# Patient Record
Sex: MALE | Race: ASIAN | NOT HISPANIC OR LATINO | Employment: FULL TIME | URBAN - METROPOLITAN AREA
[De-identification: names, ages, dates, MRNs, and addresses within clinical notes are randomized per-mention and may not be internally consistent; named-entity substitution may affect disease eponyms.]

---

## 2023-10-11 ENCOUNTER — OFFICE VISIT (OUTPATIENT)
Dept: PRIMARY CARE | Facility: CLINIC | Age: 68
End: 2023-10-11
Payer: MEDICARE

## 2023-10-11 VITALS
SYSTOLIC BLOOD PRESSURE: 122 MMHG | OXYGEN SATURATION: 96 % | BODY MASS INDEX: 26.46 KG/M2 | HEART RATE: 78 BPM | DIASTOLIC BLOOD PRESSURE: 78 MMHG | WEIGHT: 155 LBS | HEIGHT: 64 IN

## 2023-10-11 DIAGNOSIS — Z95.5 HISTORY OF HEART ARTERY STENT: ICD-10-CM

## 2023-10-11 DIAGNOSIS — E78.5 HYPERLIPIDEMIA, UNSPECIFIED HYPERLIPIDEMIA TYPE: ICD-10-CM

## 2023-10-11 DIAGNOSIS — E78.00 PURE HYPERCHOLESTEROLEMIA: ICD-10-CM

## 2023-10-11 DIAGNOSIS — E11.9 DIABETES MELLITUS WITHOUT COMPLICATION (MULTI): ICD-10-CM

## 2023-10-11 DIAGNOSIS — I10 ESSENTIAL HYPERTENSION, BENIGN: ICD-10-CM

## 2023-10-11 DIAGNOSIS — I25.10 CORONARY ARTERY DISEASE INVOLVING NATIVE CORONARY ARTERY OF NATIVE HEART WITHOUT ANGINA PECTORIS: Primary | ICD-10-CM

## 2023-10-11 DIAGNOSIS — I10 PRIMARY HYPERTENSION: ICD-10-CM

## 2023-10-11 DIAGNOSIS — M19.011 PRIMARY OSTEOARTHRITIS OF BOTH SHOULDERS: ICD-10-CM

## 2023-10-11 DIAGNOSIS — Z00.00 WELLNESS EXAMINATION: ICD-10-CM

## 2023-10-11 DIAGNOSIS — M19.012 PRIMARY OSTEOARTHRITIS OF BOTH SHOULDERS: ICD-10-CM

## 2023-10-11 PROCEDURE — 99204 OFFICE O/P NEW MOD 45 MIN: CPT | Performed by: INTERNAL MEDICINE

## 2023-10-11 PROCEDURE — G0008 ADMIN INFLUENZA VIRUS VAC: HCPCS | Performed by: INTERNAL MEDICINE

## 2023-10-11 PROCEDURE — 3078F DIAST BP <80 MM HG: CPT | Performed by: INTERNAL MEDICINE

## 2023-10-11 PROCEDURE — 1159F MED LIST DOCD IN RCRD: CPT | Performed by: INTERNAL MEDICINE

## 2023-10-11 PROCEDURE — 3074F SYST BP LT 130 MM HG: CPT | Performed by: INTERNAL MEDICINE

## 2023-10-11 PROCEDURE — 90662 IIV NO PRSV INCREASED AG IM: CPT | Performed by: INTERNAL MEDICINE

## 2023-10-11 PROCEDURE — 4010F ACE/ARB THERAPY RXD/TAKEN: CPT | Performed by: INTERNAL MEDICINE

## 2023-10-11 PROCEDURE — 1036F TOBACCO NON-USER: CPT | Performed by: INTERNAL MEDICINE

## 2023-10-11 RX ORDER — METOPROLOL SUCCINATE 25 MG/1
25 TABLET, EXTENDED RELEASE ORAL
COMMUNITY
Start: 2022-11-27 | End: 2023-10-11 | Stop reason: DRUGHIGH

## 2023-10-11 RX ORDER — EMPAGLIFLOZIN 25 MG/1
25 TABLET, FILM COATED ORAL DAILY
COMMUNITY
Start: 2023-06-29 | End: 2023-10-11 | Stop reason: SDUPTHER

## 2023-10-11 RX ORDER — ATORVASTATIN CALCIUM 80 MG/1
80 TABLET, FILM COATED ORAL
Qty: 90 TABLET | Refills: 3 | Status: SHIPPED | OUTPATIENT
Start: 2023-10-11 | End: 2024-01-12 | Stop reason: SDUPTHER

## 2023-10-11 RX ORDER — BACLOFEN 10 MG/1
10 TABLET ORAL 2 TIMES DAILY PRN
Qty: 120 TABLET | Refills: 0 | Status: SHIPPED | OUTPATIENT
Start: 2023-10-11 | End: 2023-11-09 | Stop reason: ALTCHOICE

## 2023-10-11 RX ORDER — METFORMIN HYDROCHLORIDE 500 MG/1
500 TABLET ORAL
COMMUNITY
Start: 2023-09-12 | End: 2023-10-11 | Stop reason: SDUPTHER

## 2023-10-11 RX ORDER — ATORVASTATIN CALCIUM 80 MG/1
80 TABLET, FILM COATED ORAL
COMMUNITY
Start: 2022-11-26 | End: 2023-10-11 | Stop reason: SDUPTHER

## 2023-10-11 RX ORDER — NAPROXEN 500 MG/1
500 TABLET ORAL
COMMUNITY
End: 2023-10-11 | Stop reason: DRUGHIGH

## 2023-10-11 RX ORDER — PRASUGREL 10 MG/1
10 TABLET, FILM COATED ORAL
Qty: 90 TABLET | Refills: 3 | Status: SHIPPED | OUTPATIENT
Start: 2023-10-11 | End: 2023-11-09 | Stop reason: SDUPTHER

## 2023-10-11 RX ORDER — OLMESARTAN MEDOXOMIL 20 MG/1
20 TABLET ORAL DAILY
Qty: 90 TABLET | Refills: 3 | Status: SHIPPED | OUTPATIENT
Start: 2023-10-11 | End: 2023-11-09 | Stop reason: SDUPTHER

## 2023-10-11 RX ORDER — EMPAGLIFLOZIN 25 MG/1
25 TABLET, FILM COATED ORAL DAILY
Qty: 90 TABLET | Refills: 3 | Status: SHIPPED | OUTPATIENT
Start: 2023-10-11 | End: 2023-11-09 | Stop reason: SDUPTHER

## 2023-10-11 RX ORDER — OLMESARTAN MEDOXOMIL 20 MG/1
20 TABLET ORAL DAILY
COMMUNITY
Start: 2023-08-15 | End: 2023-10-11 | Stop reason: SDUPTHER

## 2023-10-11 RX ORDER — METOPROLOL SUCCINATE 25 MG/1
TABLET, EXTENDED RELEASE ORAL
Qty: 135 TABLET | Refills: 3 | Status: SHIPPED | OUTPATIENT
Start: 2023-10-11 | End: 2023-11-09 | Stop reason: SDUPTHER

## 2023-10-11 RX ORDER — NAPROXEN 250 MG/1
500 TABLET ORAL 2 TIMES DAILY PRN
Qty: 60 TABLET | Refills: 1 | Status: SHIPPED | OUTPATIENT
Start: 2023-10-11 | End: 2023-11-09 | Stop reason: ALTCHOICE

## 2023-10-11 RX ORDER — NAPROXEN SODIUM 220 MG/1
81 TABLET, FILM COATED ORAL
COMMUNITY
Start: 2022-11-26 | End: 2023-11-26

## 2023-10-11 RX ORDER — METOPROLOL SUCCINATE 25 MG/1
TABLET, EXTENDED RELEASE ORAL
Qty: 30 TABLET | Refills: 11 | Status: SHIPPED | OUTPATIENT
Start: 2023-10-11 | End: 2023-10-11 | Stop reason: SDUPTHER

## 2023-10-11 RX ORDER — METFORMIN HYDROCHLORIDE 500 MG/1
500 TABLET ORAL
Qty: 180 TABLET | Refills: 3 | Status: SHIPPED | OUTPATIENT
Start: 2023-10-11 | End: 2024-01-12 | Stop reason: SINTOL

## 2023-10-11 RX ORDER — EZETIMIBE 10 MG/1
10 TABLET ORAL
COMMUNITY
Start: 2022-11-26 | End: 2023-10-11 | Stop reason: SDUPTHER

## 2023-10-11 RX ORDER — PRASUGREL 10 MG/1
10 TABLET, FILM COATED ORAL
COMMUNITY
Start: 2022-11-26 | End: 2023-10-11 | Stop reason: SDUPTHER

## 2023-10-11 RX ORDER — EZETIMIBE 10 MG/1
10 TABLET ORAL
Qty: 90 TABLET | Refills: 3 | Status: SHIPPED | OUTPATIENT
Start: 2023-10-11 | End: 2023-11-09 | Stop reason: SDUPTHER

## 2023-10-11 ASSESSMENT — ENCOUNTER SYMPTOMS
ADENOPATHY: 0
OCCASIONAL FEELINGS OF UNSTEADINESS: 0
LOSS OF SENSATION IN FEET: 0
FATIGUE: 0
FEVER: 0
PSYCHIATRIC NEGATIVE: 1
WEAKNESS: 0
ALLERGIC/IMMUNOLOGIC NEGATIVE: 1
NUMBNESS: 0
HEMATURIA: 0
COUGH: 0
ACTIVITY CHANGE: 0
SHORTNESS OF BREATH: 0
PALPITATIONS: 0
ABDOMINAL PAIN: 0
DEPRESSION: 0
BLOOD IN STOOL: 0
HEADACHES: 0
DIZZINESS: 0
CHEST TIGHTNESS: 0
JOINT SWELLING: 0
WHEEZING: 0
CONSTIPATION: 0
FREQUENCY: 0
ARTHRALGIAS: 1
DYSURIA: 0

## 2023-10-11 NOTE — PROGRESS NOTES
"Subjective   Patient ID: Thanh Martínez is a 68 y.o. male who presents for Annual Exam.    He has moved from [facility] recently and wants to get established.         Review of Systems   Constitutional:  Negative for activity change, fatigue and fever.   HENT:  Negative for congestion.    Eyes:  Negative for visual disturbance.        He wears glasses   Respiratory:  Negative for cough, chest tightness, shortness of breath and wheezing.    Cardiovascular:  Negative for chest pain, palpitations and leg swelling.   Gastrointestinal:  Negative for abdominal pain, blood in stool and constipation.   Endocrine:        Diabetes II   Genitourinary:  Negative for dysuria, frequency and hematuria.   Musculoskeletal:  Positive for arthralgias. Negative for gait problem and joint swelling.        He has shoulder arthritis, goes for PT and takes otc pain meds   He had local Injections   Allergic/Immunologic: Negative.    Neurological:  Negative for dizziness, weakness, numbness and headaches.   Hematological:  Negative for adenopathy.   Psychiatric/Behavioral: Negative.         Objective   /78   Pulse 78   Ht 1.626 m (5' 4\")   Wt 70.3 kg (155 lb)   SpO2 96%   BMI 26.61 kg/m²     Physical Exam  Constitutional:       Appearance: Normal appearance.   HENT:      Head: Normocephalic and atraumatic.      Nose: No congestion.      Mouth/Throat:      Mouth: Mucous membranes are moist.      Pharynx: Oropharynx is clear.   Eyes:      Extraocular Movements: Extraocular movements intact.      Conjunctiva/sclera: Conjunctivae normal.      Pupils: Pupils are equal, round, and reactive to light.   Cardiovascular:      Rate and Rhythm: Normal rate and regular rhythm.      Pulses: Normal pulses.      Heart sounds: No murmur heard.  Pulmonary:      Effort: Pulmonary effort is normal.      Breath sounds: Normal breath sounds. No wheezing or rales.   Abdominal:      General: Abdomen is flat. Bowel sounds are normal.      Palpations: " Abdomen is soft.      Hernia: No hernia is present.   Musculoskeletal:      Cervical back: Normal range of motion and neck supple.      Right lower leg: No edema.      Left lower leg: No edema.      Comments: Reduced movement of both shoulders , reduced elevation   Skin:     General: Skin is warm and dry.      Capillary Refill: Capillary refill takes more than 3 seconds.      Findings: No rash.   Neurological:      General: No focal deficit present.      Mental Status: He is alert and oriented to person, place, and time.   Psychiatric:         Mood and Affect: Mood normal.         Behavior: Behavior normal.         Assessment/Plan     Problem List Items Addressed This Visit       Coronary artery disease involving native coronary artery of native heart without angina pectoris - Primary    Relevant Medications    prasugrel (Effient) 10 mg tablet    metoprolol succinate XL (Toprol-XL) 25 mg 24 hr tablet    Other Relevant Orders    Referral to Cardiology , Dr Jonathan Jones    Comprehensive metabolic panel    Diabetes mellitus without complication (CMS/HCC)    Diabetic diet, Follow A1C  Monitor BS, Freestyle Abdullahi     Relevant Orders    CBC and Auto Differential    Comprehensive metabolic panel    Lipid panel    Hemoglobin A1c    Tsh With Reflex To Free T4 If Abnormal    Albumin, urine, random      Pure hypercholesterolemia  Statin, cardiac diet    History of heart artery stent  Effient, Cardiology referral      Primary hypertension  Benicar , Metoprolol Succinate XL      Primary osteoarthritis of both shoulders  Continue PT   He takes Naprosyn as needed  Stretching exercises       Other Visit Diagnoses       Essential hypertension, benign        Relevant Orders    Referral to Cardiology    CBC and Auto Differential    Comprehensive metabolic panel    Lipid panel    Albumin, urine, random    Hyperlipidemia, unspecified hyperlipidemia type        Relevant Orders    Referral to Cardiology    CBC and Auto Differential     Comprehensive metabolic panel    Lipid panel    Tsh With Reflex To Free T4 If Abnormal            PSA, total and free       Flushot high dose   Covid booster at pharmacy  He had colonoscopy twice before  Get MR Release from [place]

## 2023-10-16 ENCOUNTER — LAB (OUTPATIENT)
Dept: LAB | Facility: LAB | Age: 68
End: 2023-10-16
Payer: MEDICARE

## 2023-10-16 DIAGNOSIS — E55.9 VITAMIN D DEFICIENCY, UNSPECIFIED: Primary | ICD-10-CM

## 2023-10-16 DIAGNOSIS — Z00.00 WELLNESS EXAMINATION: ICD-10-CM

## 2023-10-16 DIAGNOSIS — I10 ESSENTIAL HYPERTENSION, BENIGN: ICD-10-CM

## 2023-10-16 DIAGNOSIS — I25.10 CORONARY ARTERY DISEASE INVOLVING NATIVE CORONARY ARTERY OF NATIVE HEART WITHOUT ANGINA PECTORIS: ICD-10-CM

## 2023-10-16 DIAGNOSIS — E78.5 HYPERLIPIDEMIA, UNSPECIFIED HYPERLIPIDEMIA TYPE: ICD-10-CM

## 2023-10-16 DIAGNOSIS — E11.9 DIABETES MELLITUS WITHOUT COMPLICATION (MULTI): ICD-10-CM

## 2023-10-16 LAB
25(OH)D3 SERPL-MCNC: 45 NG/ML (ref 30–100)
ALBUMIN SERPL BCP-MCNC: 4.4 G/DL (ref 3.4–5)
ALP SERPL-CCNC: 80 U/L (ref 33–136)
ALT SERPL W P-5'-P-CCNC: 216 U/L (ref 10–52)
ANION GAP SERPL CALC-SCNC: 12 MMOL/L (ref 10–20)
AST SERPL W P-5'-P-CCNC: 99 U/L (ref 9–39)
BASOPHILS # BLD AUTO: 0.04 X10*3/UL (ref 0–0.1)
BASOPHILS NFR BLD AUTO: 0.3 %
BILIRUB SERPL-MCNC: 0.8 MG/DL (ref 0–1.2)
BUN SERPL-MCNC: 39 MG/DL (ref 6–23)
CALCIUM SERPL-MCNC: 10.1 MG/DL (ref 8.6–10.3)
CHLORIDE SERPL-SCNC: 108 MMOL/L (ref 98–107)
CHOLEST SERPL-MCNC: 93 MG/DL (ref 0–199)
CHOLESTEROL/HDL RATIO: 2.2
CO2 SERPL-SCNC: 24 MMOL/L (ref 21–32)
CREAT SERPL-MCNC: 1.68 MG/DL (ref 0.5–1.3)
CREAT UR-MCNC: 70.6 MG/DL (ref 20–370)
EOSINOPHIL # BLD AUTO: 0.02 X10*3/UL (ref 0–0.7)
EOSINOPHIL NFR BLD AUTO: 0.2 %
ERYTHROCYTE [DISTWIDTH] IN BLOOD BY AUTOMATED COUNT: 13 % (ref 11.5–14.5)
EST. AVERAGE GLUCOSE BLD GHB EST-MCNC: 183 MG/DL
GFR SERPL CREATININE-BSD FRML MDRD: 44 ML/MIN/1.73M*2
GLUCOSE SERPL-MCNC: 163 MG/DL (ref 74–99)
HBA1C MFR BLD: 8 %
HCT VFR BLD AUTO: 51.6 % (ref 41–52)
HDLC SERPL-MCNC: 42.3 MG/DL
HGB BLD-MCNC: 16.2 G/DL (ref 13.5–17.5)
IMM GRANULOCYTES # BLD AUTO: 0.02 X10*3/UL (ref 0–0.7)
IMM GRANULOCYTES NFR BLD AUTO: 0.2 % (ref 0–0.9)
LDLC SERPL CALC-MCNC: 31 MG/DL
LYMPHOCYTES # BLD AUTO: 2.42 X10*3/UL (ref 1.2–4.8)
LYMPHOCYTES NFR BLD AUTO: 21.1 %
MCH RBC QN AUTO: 30.2 PG (ref 26–34)
MCHC RBC AUTO-ENTMCNC: 31.4 G/DL (ref 32–36)
MCV RBC AUTO: 96 FL (ref 80–100)
MICROALBUMIN UR-MCNC: 7.1 MG/L
MICROALBUMIN/CREAT UR: 10.1 UG/MG CREAT
MONOCYTES # BLD AUTO: 1.08 X10*3/UL (ref 0.1–1)
MONOCYTES NFR BLD AUTO: 9.4 %
NEUTROPHILS # BLD AUTO: 7.87 X10*3/UL (ref 1.2–7.7)
NEUTROPHILS NFR BLD AUTO: 68.8 %
NON HDL CHOLESTEROL: 51 MG/DL (ref 0–149)
NRBC BLD-RTO: 0 /100 WBCS (ref 0–0)
PLATELET # BLD AUTO: 306 X10*3/UL (ref 150–450)
PMV BLD AUTO: 10.2 FL (ref 7.5–11.5)
POTASSIUM SERPL-SCNC: 4.7 MMOL/L (ref 3.5–5.3)
PROT SERPL-MCNC: 6.7 G/DL (ref 6.4–8.2)
RBC # BLD AUTO: 5.36 X10*6/UL (ref 4.5–5.9)
SODIUM SERPL-SCNC: 139 MMOL/L (ref 136–145)
TRIGL SERPL-MCNC: 97 MG/DL (ref 0–149)
TSH SERPL-ACNC: 2.92 MIU/L (ref 0.44–3.98)
VLDL: 19 MG/DL (ref 0–40)
WBC # BLD AUTO: 11.5 X10*3/UL (ref 4.4–11.3)

## 2023-10-16 PROCEDURE — 85025 COMPLETE CBC W/AUTO DIFF WBC: CPT

## 2023-10-16 PROCEDURE — 36415 COLL VENOUS BLD VENIPUNCTURE: CPT

## 2023-10-16 PROCEDURE — 80061 LIPID PANEL: CPT

## 2023-10-16 PROCEDURE — 84154 ASSAY OF PSA FREE: CPT

## 2023-10-16 PROCEDURE — 83036 HEMOGLOBIN GLYCOSYLATED A1C: CPT

## 2023-10-16 PROCEDURE — 82043 UR ALBUMIN QUANTITATIVE: CPT

## 2023-10-16 PROCEDURE — 84153 ASSAY OF PSA TOTAL: CPT

## 2023-10-16 PROCEDURE — 82306 VITAMIN D 25 HYDROXY: CPT

## 2023-10-16 PROCEDURE — 84443 ASSAY THYROID STIM HORMONE: CPT

## 2023-10-16 PROCEDURE — 82570 ASSAY OF URINE CREATININE: CPT

## 2023-10-16 PROCEDURE — 80053 COMPREHEN METABOLIC PANEL: CPT

## 2023-10-19 LAB
PSA FREE MFR SERPL: 33 %
PSA FREE SERPL-MCNC: 0.2 NG/ML
PSA SERPL IA-MCNC: 0.6 NG/ML (ref 0–4)

## 2023-10-24 ENCOUNTER — TELEPHONE (OUTPATIENT)
Dept: PRIMARY CARE | Facility: CLINIC | Age: 68
End: 2023-10-24
Payer: MEDICARE

## 2023-10-24 DIAGNOSIS — E11.9 DIABETES MELLITUS WITHOUT COMPLICATION (MULTI): Primary | ICD-10-CM

## 2023-10-24 RX ORDER — FLASH GLUCOSE SENSOR
KIT MISCELLANEOUS
Qty: 6 EACH | Refills: 3 | Status: SHIPPED | OUTPATIENT
Start: 2023-10-24

## 2023-11-06 PROBLEM — M50.10 CERVICAL DISC DISORDER WITH RADICULOPATHY: Status: ACTIVE | Noted: 2021-09-01

## 2023-11-06 PROBLEM — I20.2 REFRACTORY ANGINA PECTORIS (CMS-HCC): Status: ACTIVE | Noted: 2022-11-22

## 2023-11-06 PROBLEM — E11.9 DIABETES MELLITUS (MULTI): Status: ACTIVE | Noted: 2022-11-25

## 2023-11-06 PROBLEM — I21.3 STEMI (ST ELEVATION MYOCARDIAL INFARCTION) (MULTI): Status: ACTIVE | Noted: 2022-11-22

## 2023-11-06 PROBLEM — E78.2 MIXED HYPERLIPIDEMIA: Status: ACTIVE | Noted: 2022-11-25

## 2023-11-06 RX ORDER — CALCIUM CARBONATE 200(500)MG
1000 TABLET,CHEWABLE ORAL 3 TIMES DAILY PRN
COMMUNITY
Start: 2022-11-26 | End: 2023-11-09 | Stop reason: ALTCHOICE

## 2023-11-06 RX ORDER — FENOFIBRIC ACID 45 MG/1
1 CAPSULE, DELAYED RELEASE ORAL DAILY
COMMUNITY
Start: 2022-09-15 | End: 2023-11-09 | Stop reason: ALTCHOICE

## 2023-11-06 RX ORDER — METFORMIN HYDROCHLORIDE 850 MG/1
TABLET ORAL 2 TIMES DAILY
COMMUNITY
Start: 2021-10-08 | End: 2023-11-09 | Stop reason: ALTCHOICE

## 2023-11-06 RX ORDER — PANTOPRAZOLE SODIUM 40 MG/1
40 TABLET, DELAYED RELEASE ORAL
COMMUNITY
Start: 2022-11-26 | End: 2023-11-09 | Stop reason: ALTCHOICE

## 2023-11-06 RX ORDER — NITROGLYCERIN 0.4 MG/1
TABLET SUBLINGUAL
COMMUNITY
Start: 2022-11-27

## 2023-11-06 RX ORDER — POLYETHYLENE GLYCOL 3350 17 G/17G
POWDER, FOR SOLUTION ORAL
COMMUNITY
Start: 2022-11-26 | End: 2023-11-09 | Stop reason: ALTCHOICE

## 2023-11-06 RX ORDER — PIOGLITAZONEHYDROCHLORIDE 15 MG/1
1 TABLET ORAL DAILY
COMMUNITY
Start: 2022-09-15 | End: 2023-11-09 | Stop reason: ALTCHOICE

## 2023-11-06 RX ORDER — FAMOTIDINE 10 MG/1
10 TABLET ORAL 2 TIMES DAILY
COMMUNITY
End: 2023-11-09 | Stop reason: ALTCHOICE

## 2023-11-06 RX ORDER — METHOCARBAMOL 750 MG/1
TABLET, FILM COATED ORAL
COMMUNITY
Start: 2018-09-12 | End: 2023-11-09 | Stop reason: ALTCHOICE

## 2023-11-06 RX ORDER — TRAZODONE HYDROCHLORIDE 50 MG/1
1 TABLET ORAL DAILY
COMMUNITY
Start: 2022-09-05 | End: 2023-11-09 | Stop reason: ALTCHOICE

## 2023-11-06 RX ORDER — ASPIRIN 325 MG
325 TABLET, DELAYED RELEASE (ENTERIC COATED) ORAL EVERY 6 HOURS PRN
COMMUNITY
End: 2023-11-09 | Stop reason: ALTCHOICE

## 2023-11-06 RX ORDER — METOPROLOL TARTRATE 50 MG/1
50 TABLET ORAL 2 TIMES DAILY
COMMUNITY
End: 2023-11-09 | Stop reason: ALTCHOICE

## 2023-11-06 RX ORDER — LOSARTAN POTASSIUM 25 MG/1
1 TABLET ORAL DAILY
COMMUNITY
Start: 2022-09-15 | End: 2023-11-09 | Stop reason: ALTCHOICE

## 2023-11-06 RX ORDER — FUROSEMIDE 20 MG/1
TABLET ORAL
COMMUNITY
Start: 2022-11-27 | End: 2023-11-09 | Stop reason: ALTCHOICE

## 2023-11-06 RX ORDER — LISINOPRIL 30 MG/1
30 TABLET ORAL
COMMUNITY
End: 2023-11-09 | Stop reason: ALTCHOICE

## 2023-11-06 RX ORDER — CLOPIDOGREL BISULFATE 75 MG/1
1 TABLET ORAL DAILY
COMMUNITY
Start: 2022-09-15 | End: 2023-11-09 | Stop reason: WASHOUT

## 2023-11-06 RX ORDER — LIDOCAINE 50 MG/G
1 PATCH TOPICAL
COMMUNITY
Start: 2018-09-12 | End: 2023-11-09 | Stop reason: ALTCHOICE

## 2023-11-06 RX ORDER — ONDANSETRON 4 MG/1
4 TABLET, ORALLY DISINTEGRATING ORAL EVERY 8 HOURS PRN
COMMUNITY
Start: 2018-09-12 | End: 2023-11-09 | Stop reason: ALTCHOICE

## 2023-11-06 RX ORDER — METOPROLOL TARTRATE 50 MG/1
0.5 TABLET ORAL 2 TIMES DAILY
COMMUNITY
Start: 2022-09-15 | End: 2023-11-09 | Stop reason: ALTCHOICE

## 2023-11-06 RX ORDER — ATORVASTATIN CALCIUM 40 MG/1
1 TABLET, FILM COATED ORAL DAILY
COMMUNITY
Start: 2022-09-15 | End: 2023-11-09 | Stop reason: ALTCHOICE

## 2023-11-06 RX ORDER — IBUPROFEN 600 MG/1
600 TABLET ORAL EVERY 8 HOURS PRN
COMMUNITY
Start: 2018-09-12 | End: 2023-11-09 | Stop reason: ALTCHOICE

## 2023-11-06 RX ORDER — SENNOSIDES 8.6 MG/1
2 TABLET ORAL 2 TIMES DAILY PRN
COMMUNITY
Start: 2022-11-26 | End: 2023-11-09 | Stop reason: ALTCHOICE

## 2023-11-09 ENCOUNTER — OFFICE VISIT (OUTPATIENT)
Dept: CARDIOLOGY | Facility: CLINIC | Age: 68
End: 2023-11-09
Payer: MEDICARE

## 2023-11-09 VITALS
HEIGHT: 65 IN | TEMPERATURE: 98.2 F | HEART RATE: 86 BPM | SYSTOLIC BLOOD PRESSURE: 157 MMHG | WEIGHT: 152.1 LBS | BODY MASS INDEX: 25.34 KG/M2 | DIASTOLIC BLOOD PRESSURE: 89 MMHG

## 2023-11-09 DIAGNOSIS — I25.10 CORONARY ARTERY DISEASE INVOLVING NATIVE CORONARY ARTERY OF NATIVE HEART WITHOUT ANGINA PECTORIS: Primary | ICD-10-CM

## 2023-11-09 DIAGNOSIS — Z95.5 STATUS POST INSERTION OF DRUG-ELUTING STENT INTO LEFT ANTERIOR DESCENDING (LAD) ARTERY: ICD-10-CM

## 2023-11-09 DIAGNOSIS — I21.02 ST ELEVATION MYOCARDIAL INFARCTION INVOLVING LEFT ANTERIOR DESCENDING (LAD) CORONARY ARTERY (MULTI): ICD-10-CM

## 2023-11-09 DIAGNOSIS — Z95.5 HISTORY OF HEART ARTERY STENT: ICD-10-CM

## 2023-11-09 DIAGNOSIS — I10 ESSENTIAL HYPERTENSION, BENIGN: ICD-10-CM

## 2023-11-09 DIAGNOSIS — E11.9 DIABETES MELLITUS WITHOUT COMPLICATION (MULTI): ICD-10-CM

## 2023-11-09 DIAGNOSIS — Z95.5 PRESENCE OF DRUG-ELUTING STENT IN LEFT CIRCUMFLEX CORONARY ARTERY: ICD-10-CM

## 2023-11-09 DIAGNOSIS — E78.00 PURE HYPERCHOLESTEROLEMIA: ICD-10-CM

## 2023-11-09 DIAGNOSIS — E78.5 HYPERLIPIDEMIA, UNSPECIFIED HYPERLIPIDEMIA TYPE: ICD-10-CM

## 2023-11-09 PROBLEM — I20.2 REFRACTORY ANGINA PECTORIS (CMS-HCC): Status: RESOLVED | Noted: 2022-11-22 | Resolved: 2023-11-09

## 2023-11-09 PROBLEM — E78.2 MIXED HYPERLIPIDEMIA: Status: RESOLVED | Noted: 2022-11-25 | Resolved: 2023-11-09

## 2023-11-09 PROCEDURE — 1159F MED LIST DOCD IN RCRD: CPT | Performed by: INTERNAL MEDICINE

## 2023-11-09 PROCEDURE — 99215 OFFICE O/P EST HI 40 MIN: CPT | Performed by: INTERNAL MEDICINE

## 2023-11-09 PROCEDURE — 3079F DIAST BP 80-89 MM HG: CPT | Performed by: INTERNAL MEDICINE

## 2023-11-09 PROCEDURE — 1036F TOBACCO NON-USER: CPT | Performed by: INTERNAL MEDICINE

## 2023-11-09 PROCEDURE — 3061F NEG MICROALBUMINURIA REV: CPT | Performed by: INTERNAL MEDICINE

## 2023-11-09 PROCEDURE — 4010F ACE/ARB THERAPY RXD/TAKEN: CPT | Performed by: INTERNAL MEDICINE

## 2023-11-09 PROCEDURE — 3048F LDL-C <100 MG/DL: CPT | Performed by: INTERNAL MEDICINE

## 2023-11-09 PROCEDURE — 3052F HG A1C>EQUAL 8.0%<EQUAL 9.0%: CPT | Performed by: INTERNAL MEDICINE

## 2023-11-09 PROCEDURE — 1160F RVW MEDS BY RX/DR IN RCRD: CPT | Performed by: INTERNAL MEDICINE

## 2023-11-09 PROCEDURE — 3077F SYST BP >= 140 MM HG: CPT | Performed by: INTERNAL MEDICINE

## 2023-11-09 RX ORDER — EZETIMIBE 10 MG/1
10 TABLET ORAL
Qty: 90 TABLET | Refills: 3 | Status: SHIPPED | OUTPATIENT
Start: 2023-11-09 | End: 2024-01-12 | Stop reason: SDUPTHER

## 2023-11-09 RX ORDER — EMPAGLIFLOZIN 25 MG/1
25 TABLET, FILM COATED ORAL DAILY
Qty: 90 TABLET | Refills: 3 | Status: SHIPPED | OUTPATIENT
Start: 2023-11-09 | End: 2024-01-11 | Stop reason: WASHOUT

## 2023-11-09 RX ORDER — PRASUGREL 10 MG/1
10 TABLET, FILM COATED ORAL
Qty: 90 TABLET | Refills: 3 | Status: SHIPPED | OUTPATIENT
Start: 2023-11-09

## 2023-11-09 RX ORDER — OLMESARTAN MEDOXOMIL 20 MG/1
20 TABLET ORAL DAILY
Qty: 90 TABLET | Refills: 3 | Status: SHIPPED | OUTPATIENT
Start: 2023-11-09 | End: 2024-01-12 | Stop reason: SDUPTHER

## 2023-11-09 RX ORDER — METOPROLOL SUCCINATE 25 MG/1
TABLET, EXTENDED RELEASE ORAL
Qty: 135 TABLET | Refills: 3 | Status: SHIPPED | OUTPATIENT
Start: 2023-11-09 | End: 2024-01-12 | Stop reason: SDUPTHER

## 2023-11-09 NOTE — PROGRESS NOTES
"Chief Complaint:   Coronary Artery Disease     History Of Present Illness:    Thanh Martínez is a 68 y.o. male presenting for evaluation of coronary artery disease.  Thanh initially presented with an acute MI and underwent emergent PCI to the LAD and staged DARSHAN to the LCX.  He had 2 remote stents to the LAD and RCA.  He had the MI last year on ASA and Plavix. The patient is tolerating guideline-directed medical therapy with antiplatelet and statin medication and is compliant.  The patient exercises somewhat and follows a heart healthy diet.  The patient has been well since their last office appointment and is not having any anginal symptoms or dyspnea on exertion.      Review of Systems  All pertinent systems have been reviewed and are negative except for what is stated in the history of present illness.    All other systems have been reviewed and are negative and noncontributory to this patient's current ailments.  .     Last Recorded Vitals:  Visit Vitals  /89   Pulse 86   Temp 36.8 °C (98.2 °F)   Ht 1.651 m (5' 5\")   Wt 69 kg (152 lb 1.6 oz)   BMI 25.31 kg/m²   Smoking Status Never   BSA 1.78 m²        Past Medical History:  He has a past medical history of Coronary artery disease involving native coronary artery of native heart without angina pectoris (10/11/2023), Presence of drug-eluting stent in left circumflex coronary artery (11/09/2023), and Status post insertion of drug-eluting stent into left anterior descending (LAD) artery (11/09/2023).    Past Surgical History:  He has a past surgical history that includes Carotid stent.      Social History:  He reports that he has never smoked. He has never used smokeless tobacco. He reports that he does not drink alcohol and does not use drugs.    Family History:  No family history on file.     Allergies:  Patient has no known allergies.    Outpatient Medications:  Current Outpatient Medications   Medication Instructions    aspirin 81 mg, oral, Daily RT    " atorvastatin (LIPITOR) 80 mg, oral, Daily RT    clopidogrel (Plavix) 75 mg tablet 1 tablet, oral, Daily    ezetimibe (ZETIA) 10 mg, oral, Daily RT    FreeStyle Abdullahi sensor system (FreeStyle Abdullahi 2 Sensor) kit Use as instructed    Jardiance 25 mg, oral, Daily    metFORMIN (GLUCOPHAGE) 500 mg, oral, 2 times daily with meals    metoprolol succinate XL (Toprol-XL) 25 mg 24 hr tablet 1 1/2 tab by mouth daily    naproxen (NAPROSYN) 500 mg, oral, 2 times daily PRN    nitroglycerin (Nitrostat) 0.4 mg SL tablet Place 1 tablet (0.4 mg total) under the tongue every 5 (five) minutes as needed for Chest pain. Please contact provider if chest pain does not go away after taking 3 consecutive doses.    olmesartan (BENICAR) 20 mg, oral, Daily    prasugrel (EFFIENT) 10 mg, oral, Daily RT       Physical Exam:  Physical Exam  Vitals reviewed.   Constitutional:       General: He is not in acute distress.     Appearance: Normal appearance.   HENT:      Head: Normocephalic and atraumatic.      Nose: Nose normal.   Eyes:      Conjunctiva/sclera: Conjunctivae normal.   Cardiovascular:      Rate and Rhythm: Normal rate and regular rhythm.      Pulses: Normal pulses.      Heart sounds: No murmur heard.  Pulmonary:      Effort: Pulmonary effort is normal. No respiratory distress.      Breath sounds: Normal breath sounds. No wheezing, rhonchi or rales.   Abdominal:      General: Bowel sounds are normal. There is no distension.      Palpations: Abdomen is soft.      Tenderness: There is no abdominal tenderness.   Musculoskeletal:         General: No swelling.      Right lower leg: No edema.      Left lower leg: No edema.   Skin:     General: Skin is warm and dry.      Capillary Refill: Capillary refill takes less than 2 seconds.   Neurological:      General: No focal deficit present.      Mental Status: He is alert.   Psychiatric:         Mood and Affect: Mood normal.           Last Labs:  CBC -  Lab Results   Component Value Date    WBC 11.5  (H) 10/16/2023    HGB 16.2 10/16/2023    HCT 51.6 10/16/2023    MCV 96 10/16/2023     10/16/2023       CMP -  Lab Results   Component Value Date    CALCIUM 10.1 10/16/2023    PROT 6.7 10/16/2023    ALBUMIN 4.4 10/16/2023    AST 99 (H) 10/16/2023     (H) 10/16/2023    ALKPHOS 80 10/16/2023    BILITOT 0.8 10/16/2023       LIPID PANEL -   Lab Results   Component Value Date    CHOL 93 10/16/2023    HDL 42.3 10/16/2023    CHHDL 2.2 10/16/2023    VLDL 19 10/16/2023    TRIG 97 10/16/2023    NHDL 51 10/16/2023       RENAL FUNCTION PANEL -   Lab Results   Component Value Date    K 4.7 10/16/2023       Lab Results   Component Value Date    HGBA1C 8.0 (H) 10/16/2023       Last Cardiology Tests:  ECG:  No results found for this or any previous visit from the past 1095 days.  Echo:  No echocardiogram results found for the past 12 months           Assessment/Plan     1. Coronary artery disease involving native coronary artery of native heart without angina pectoris  CAD: The patient's CAD, as detailed in the HPI, has been clinically stable, without any anginal symptoms or dyspnea.  The patient will continue treatment with guideline-directed medical therapy with antiplatelet and statin medications and will continue regular exercise and a heart healthy diet. Would consider Ozempic for cardiac protection and his A1C 8%.       - Nuclear Stress Test; Future    2. Pure hypercholesterolemia  Hyperlipidemia: The patient's lipids are well controlled on chronic statin therapy and they are meeting their goal LDL cholesterol per the ACC/AHA guidelines.  The patient is compliant and tolerating statin therapy and is following a heart health diet and performs regular exercise.  The benefits of lipid lowering therapy have been discussed with the patient/family/caregiver.     - Nuclear Stress Test; Future    3. ST elevation myocardial infarction involving left anterior descending (LAD) coronary artery (CMS/HCC)  Stable with  preserved LV function.  Suspect Plavix non-responder as he had his STEMI on DAPT.  Will plan Prasugrel/ASA long term DAPT.  - Nuclear Stress Test; Future    4. Status post insertion of drug-eluting stent into left anterior descending (LAD) artery  Stable  - Nuclear Stress Test; Future    5. Presence of drug-eluting stent in left circumflex coronary artery  Stable  - Nuclear Stress Test; Future       Jonathan Jones MD    Exclusive of any other services or procedures performed, I, Jonathan Jones MD , spent 30 minutes in duration for this visit today.  This time consisted of chart review, obtaining history, and/or performing the exam as documented above as well as documenting the clinical information for the encounter in the electronic record, discussing treatment options, plans, and/or goals with patient, family, and/or caregiver, refilling medications, updating the electronic record, ordering medicines, lab work, imaging, referrals, and/or procedures as documented above and communicating with other [facility] professionals. I have discussed the results of laboratory, radiology, and cardiology studies with the patient and their family/caregiver.

## 2023-11-13 ENCOUNTER — TELEPHONE (OUTPATIENT)
Dept: CARDIOLOGY | Facility: CLINIC | Age: 68
End: 2023-11-13
Payer: MEDICARE

## 2023-11-13 NOTE — TELEPHONE ENCOUNTER
Spoke with pt and went over Stress prep. NPO 4 hrs prior, hold metoprolol 24 hrs prior, no caffeine 24 hrs prior. Pt verbalized understanding

## 2023-11-15 ENCOUNTER — HOSPITAL ENCOUNTER (OUTPATIENT)
Dept: CARDIOLOGY | Facility: CLINIC | Age: 68
Discharge: HOME | End: 2023-11-15
Payer: MEDICARE

## 2023-11-15 ENCOUNTER — OFFICE VISIT (OUTPATIENT)
Dept: CARDIOLOGY | Facility: CLINIC | Age: 68
End: 2023-11-15
Payer: MEDICARE

## 2023-11-15 VITALS
BODY MASS INDEX: 25.16 KG/M2 | SYSTOLIC BLOOD PRESSURE: 104 MMHG | DIASTOLIC BLOOD PRESSURE: 48 MMHG | HEART RATE: 62 BPM | HEIGHT: 65 IN | WEIGHT: 151 LBS

## 2023-11-15 VITALS
WEIGHT: 151 LBS | BODY MASS INDEX: 25.16 KG/M2 | SYSTOLIC BLOOD PRESSURE: 104 MMHG | DIASTOLIC BLOOD PRESSURE: 48 MMHG | HEIGHT: 65 IN | HEART RATE: 82 BPM

## 2023-11-15 DIAGNOSIS — Z95.5 STATUS POST INSERTION OF DRUG-ELUTING STENT INTO LEFT ANTERIOR DESCENDING (LAD) ARTERY: ICD-10-CM

## 2023-11-15 DIAGNOSIS — I10 PRIMARY HYPERTENSION: ICD-10-CM

## 2023-11-15 DIAGNOSIS — I21.02 ST ELEVATION MYOCARDIAL INFARCTION INVOLVING LEFT ANTERIOR DESCENDING (LAD) CORONARY ARTERY (MULTI): ICD-10-CM

## 2023-11-15 DIAGNOSIS — E78.00 PURE HYPERCHOLESTEROLEMIA: ICD-10-CM

## 2023-11-15 DIAGNOSIS — I25.10 CORONARY ARTERY DISEASE INVOLVING NATIVE CORONARY ARTERY OF NATIVE HEART WITHOUT ANGINA PECTORIS: Primary | ICD-10-CM

## 2023-11-15 DIAGNOSIS — Z95.5 PRESENCE OF DRUG-ELUTING STENT IN LEFT CIRCUMFLEX CORONARY ARTERY: ICD-10-CM

## 2023-11-15 DIAGNOSIS — I25.10 CORONARY ARTERY DISEASE INVOLVING NATIVE CORONARY ARTERY OF NATIVE HEART WITHOUT ANGINA PECTORIS: ICD-10-CM

## 2023-11-15 DIAGNOSIS — I25.5 CARDIOMYOPATHY, ISCHEMIC: ICD-10-CM

## 2023-11-15 PROCEDURE — 93017 CV STRESS TEST TRACING ONLY: CPT

## 2023-11-15 PROCEDURE — 2500000004 HC RX 250 GENERAL PHARMACY W/ HCPCS (ALT 636 FOR OP/ED): Performed by: INTERNAL MEDICINE

## 2023-11-15 PROCEDURE — 1160F RVW MEDS BY RX/DR IN RCRD: CPT | Performed by: INTERNAL MEDICINE

## 2023-11-15 PROCEDURE — 4010F ACE/ARB THERAPY RXD/TAKEN: CPT | Performed by: INTERNAL MEDICINE

## 2023-11-15 PROCEDURE — 3048F LDL-C <100 MG/DL: CPT | Performed by: INTERNAL MEDICINE

## 2023-11-15 PROCEDURE — 3074F SYST BP LT 130 MM HG: CPT | Performed by: INTERNAL MEDICINE

## 2023-11-15 PROCEDURE — A9502 TC99M TETROFOSMIN: HCPCS | Performed by: INTERNAL MEDICINE

## 2023-11-15 PROCEDURE — 1159F MED LIST DOCD IN RCRD: CPT | Performed by: INTERNAL MEDICINE

## 2023-11-15 PROCEDURE — 78452 HT MUSCLE IMAGE SPECT MULT: CPT

## 2023-11-15 PROCEDURE — 3078F DIAST BP <80 MM HG: CPT | Performed by: INTERNAL MEDICINE

## 2023-11-15 PROCEDURE — 3052F HG A1C>EQUAL 8.0%<EQUAL 9.0%: CPT | Performed by: INTERNAL MEDICINE

## 2023-11-15 PROCEDURE — 1036F TOBACCO NON-USER: CPT | Performed by: INTERNAL MEDICINE

## 2023-11-15 PROCEDURE — 99214 OFFICE O/P EST MOD 30 MIN: CPT | Mod: 25 | Performed by: INTERNAL MEDICINE

## 2023-11-15 PROCEDURE — 3430000001 HC RX 343 DIAGNOSTIC RADIOPHARMACEUTICALS: Performed by: INTERNAL MEDICINE

## 2023-11-15 PROCEDURE — 3061F NEG MICROALBUMINURIA REV: CPT | Performed by: INTERNAL MEDICINE

## 2023-11-15 PROCEDURE — 99214 OFFICE O/P EST MOD 30 MIN: CPT | Performed by: INTERNAL MEDICINE

## 2023-11-15 RX ORDER — REGADENOSON 0.08 MG/ML
0.4 INJECTION, SOLUTION INTRAVENOUS
Status: COMPLETED | OUTPATIENT
Start: 2023-11-15 | End: 2023-11-15

## 2023-11-15 RX ADMIN — REGADENOSON 0.4 MG: 0.08 INJECTION, SOLUTION INTRAVENOUS at 10:00

## 2023-11-15 RX ADMIN — TETROFOSMIN 10 MILLICURIE: 0.23 INJECTION, POWDER, LYOPHILIZED, FOR SOLUTION INTRAVENOUS at 07:26

## 2023-11-15 RX ADMIN — TETROFOSMIN 30 MILLICURIE: 0.23 INJECTION, POWDER, LYOPHILIZED, FOR SOLUTION INTRAVENOUS at 09:31

## 2023-11-15 NOTE — PROGRESS NOTES
"Chief Complaint:   Coronary Artery Disease     History Of Present Illness:    Thanh Martínez is a 68 y.o. male presenting with for follow-up of coronary artery disease.  Thanh initially presented with an acute anterior STEMI 11/22/22 and underwent emergent DARSHAN LAD.  The patient is tolerating guideline-directed medical therapy with antiplatelet and statin medication and is compliant.  The patient exercises regularly and follows a heart healthy diet.  The patient has been well since their last office appointment and is not having any anginal symptoms or dyspnea on exertion.      Review of Systems  All pertinent systems have been reviewed and are negative except for what is stated in the history of present illness.    All other systems have been reviewed and are negative and noncontributory to this patient's current ailments.   .     Last Recorded Vitals:  Visit Vitals  BP (!) 104/48   Pulse 82   Ht 1.651 m (5' 5\")   Wt 68.5 kg (151 lb)   BMI 25.13 kg/m²   Smoking Status Never   BSA 1.77 m²        Past Medical History:  He has a past medical history of Cardiomyopathy, ischemic (11/15/2023), Coronary artery disease involving native coronary artery of native heart without angina pectoris (10/11/2023), Presence of drug-eluting stent in left circumflex coronary artery (11/09/2023), and Status post insertion of drug-eluting stent into left anterior descending (LAD) artery (11/09/2023).    Past Surgical History:  He has a past surgical history that includes Carotid stent.      Social History:  He reports that he has never smoked. He has never used smokeless tobacco. He reports that he does not drink alcohol and does not use drugs.    Family History:  No family history on file.     Allergies:  Patient has no known allergies.    Outpatient Medications:  Current Outpatient Medications   Medication Instructions    aspirin 81 mg, oral, Daily RT    atorvastatin (LIPITOR) 80 mg, oral, Daily RT    ezetimibe (ZETIA) 10 mg, oral, " Daily RT    FreeStyle Abdullahi sensor system (FreeStyle Abdullahi 2 Sensor) kit Use as instructed    Jardiance 25 mg, oral, Daily    metFORMIN (GLUCOPHAGE) 500 mg, oral, 2 times daily with meals    metoprolol succinate XL (Toprol-XL) 25 mg 24 hr tablet 1 1/2 tab by mouth daily    nitroglycerin (Nitrostat) 0.4 mg SL tablet Place 1 tablet (0.4 mg total) under the tongue every 5 (five) minutes as needed for Chest pain. Please contact provider if chest pain does not go away after taking 3 consecutive doses.    olmesartan (BENICAR) 20 mg, oral, Daily    prasugrel (EFFIENT) 10 mg, oral, Daily RT       Physical Exam:  Physical Exam  Vitals reviewed.   Constitutional:       General: He is not in acute distress.     Appearance: Normal appearance.   HENT:      Head: Normocephalic and atraumatic.      Nose: Nose normal.   Eyes:      Conjunctiva/sclera: Conjunctivae normal.   Cardiovascular:      Rate and Rhythm: Normal rate and regular rhythm.      Pulses: Normal pulses.      Heart sounds: No murmur heard.  Pulmonary:      Effort: Pulmonary effort is normal. No respiratory distress.      Breath sounds: Normal breath sounds. No wheezing, rhonchi or rales.   Abdominal:      General: Bowel sounds are normal. There is no distension.      Palpations: Abdomen is soft.      Tenderness: There is no abdominal tenderness.   Musculoskeletal:         General: No swelling.      Right lower leg: No edema.      Left lower leg: No edema.   Skin:     General: Skin is warm and dry.      Capillary Refill: Capillary refill takes less than 2 seconds.   Neurological:      General: No focal deficit present.      Mental Status: He is alert.   Psychiatric:         Mood and Affect: Mood normal.            Last Labs:  CBC -  Lab Results   Component Value Date    WBC 11.5 (H) 10/16/2023    HGB 16.2 10/16/2023    HCT 51.6 10/16/2023    MCV 96 10/16/2023     10/16/2023       CMP -  Lab Results   Component Value Date    CALCIUM 10.1 10/16/2023    PROT 6.7  10/16/2023    ALBUMIN 4.4 10/16/2023    AST 99 (H) 10/16/2023     (H) 10/16/2023    ALKPHOS 80 10/16/2023    BILITOT 0.8 10/16/2023       LIPID PANEL -   Lab Results   Component Value Date    CHOL 93 10/16/2023    HDL 42.3 10/16/2023    CHHDL 2.2 10/16/2023    VLDL 19 10/16/2023    TRIG 97 10/16/2023    NHDL 51 10/16/2023       RENAL FUNCTION PANEL -   Lab Results   Component Value Date    K 4.7 10/16/2023       Lab Results   Component Value Date    HGBA1C 8.0 (H) 10/16/2023       Last Cardiology Tests:  ECG:  No results found for this or any previous visit from the past 1095 days.    Echo:  No echocardiogram results found for the past 12 months     Stress MPI today: Large LAD scar, no ischemia EF 35%      Assessment/Plan       1. Coronary artery disease involving native coronary artery of native heart without angina pectoris  CAD: The patient's CAD, as detailed in the HPI, has been clinically stable, without any anginal symptoms or dyspnea.  The patient will continue treatment with guideline-directed medical therapy with antiplatelet and statin medications and will continue regular exercise and a heart healthy diet.        - Referral to Cardiac Electrophysiology; Future    2. Pure hypercholesterolemia  Hyperlipidemia: The patient's lipids are well controlled on chronic statin therapy and they are meeting their goal LDL cholesterol per the ACC/AHA guidelines.  The patient is compliant and tolerating statin therapy and is following a heart health diet and performs regular exercise.  The benefits of lipid lowering therapy have been discussed with the patient/family/caregiver.     - Referral to Cardiac Electrophysiology; Future    3. Primary hypertension  Hypertension: The patient's blood pressure has been well-controlled at today's appointment or by recent primary care provider's measurements/home measurements and meets their goal blood pressure per the ACC/AHA guidelines.  The patient has been compliant with  their anti-hypertensive medications and is following a low sodium/DASH diet and performs regular exercise.  I advised continuation of their present medical treatment and lifestyle modification.      - Referral to Cardiac Electrophysiology; Future    4. Status post insertion of drug-eluting stent into left anterior descending (LAD) artery  Stable on long term DAPT  - Referral to Cardiac Electrophysiology; Future    5. Cardiomyopathy, ischemic  EF 35% s/p LAD infarction (large). Consider CRT-D.  - Referral to Cardiac Electrophysiology; Future    6. ST elevation myocardial infarction involving left anterior descending (LAD) coronary artery (CMS/[facility])  As above.  - Referral to Cardiac Electrophysiology; Future       Jonathan Jones MD    Exclusive of any other services or procedures performed, I, Jonathan Jones MD , spent 30 minutes in duration for this visit today.  This time consisted of chart review, obtaining history, and/or performing the exam as documented above as well as documenting the clinical information for the encounter in the electronic record, discussing treatment options, plans, and/or goals with patient, family, and/or caregiver, refilling medications, updating the electronic record, ordering medicines, lab work, imaging, referrals, and/or procedures as documented above and communicating with other [facility] professionals. I have discussed the results of laboratory, radiology, and cardiology studies with the patient and their family/caregiver.

## 2023-12-08 RX ORDER — BACLOFEN 10 MG/1
10 TABLET ORAL 2 TIMES DAILY PRN
COMMUNITY
Start: 2023-10-11 | End: 2023-12-10

## 2023-12-14 ENCOUNTER — APPOINTMENT (OUTPATIENT)
Dept: CARDIOLOGY | Facility: CLINIC | Age: 68
End: 2023-12-14
Payer: MEDICARE

## 2024-01-10 NOTE — PROGRESS NOTES
[facility] Heart and Vascular Electrophysiology    Patient Name: Thanh Martínez  Patient : 1955    Referred  for      Thanh Martínez is a 68 y.o. year old male patient with    CAD: History of STEMI in 2022 and was treated at [facility] with staged PCI to proximal LAD DARSHAN x 2 and PCI to OM. Apparently was placed on HF meds at that time.  CKD stage II  Diabetes  Hypertension  Ischemic cardiomyopathy: Seen by Dr. Jones. Found to have a LBBB and EF 35%. Nuclear stress test with akinetic anterior wall and severely reduced LV function. NYHA class II. No palpitations, syncope, lightheadedness.    Patient referred for ischemic cardiomyopathy and possible need of CRT-D    Past Medical History:  He has a past medical history of Cardiomyopathy, ischemic (11/15/2023), Coronary artery disease involving native coronary artery of native heart without angina pectoris (10/11/2023), Presence of drug-eluting stent in left circumflex coronary artery (2023), and Status post insertion of drug-eluting stent into left anterior descending (LAD) artery (2023).    Past Surgical History:  He has a past surgical history that includes Carotid stent.      Social History:  He reports that he has never smoked. He has never used smokeless tobacco. He reports that he does not drink alcohol and does not use drugs.    Family History:  No family history on file.     Allergies:  Patient has no known allergies.    Outpatient Medications:  Current Outpatient Medications   Medication Instructions    atorvastatin (LIPITOR) 80 mg, oral, Daily RT    ezetimibe (ZETIA) 10 mg, oral, Daily RT    FreeStyle Abdullahi sensor system (FreeStyle Abdullahi 2 Sensor) kit Use as instructed    Jardiance 25 mg, oral, Daily    metFORMIN (GLUCOPHAGE) 500 mg, oral, 2 times daily with meals    metoprolol succinate XL (Toprol-XL) 25 mg 24 hr tablet 1 1/2 tab by mouth daily    nitroglycerin (Nitrostat) 0.4 mg SL tablet Place 1 tablet  (0.4 mg total) under the tongue every 5 (five) minutes as needed for Chest pain. Please contact provider if chest pain does not go away after taking 3 consecutive doses.    olmesartan (BENICAR) 20 mg, oral, Daily    prasugrel (EFFIENT) 10 mg, oral, Daily RT        ROS:  A 14 point review of systems was done and is negative other than as stated in HPI    Vitals:  There were no vitals filed for this visit.    Physical Exam  Constitutional:       General: He is not in acute distress.     Appearance: Normal appearance.   Cardiovascular:      Rate and Rhythm: Normal rate and regular rhythm.   Musculoskeletal:         General: No swelling.   Skin:     General: Skin is warm and dry.   Neurological:      Mental Status: He is alert.          Labs:   Lab Results   Component Value Date    WBC 11.5 (H) 10/16/2023    HGB 16.2 10/16/2023    HCT 51.6 10/16/2023     10/16/2023     (H) 10/16/2023    AST 99 (H) 10/16/2023     10/16/2023    K 4.7 10/16/2023     (H) 10/16/2023    CREATININE 1.68 (H) 10/16/2023    BUN 39 (H) 10/16/2023    CO2 24 10/16/2023    TSH 2.92 10/16/2023          ECG  No results found for this or any previous visit (from the past 4464 hour(s)).    Echocardiogram  No results found for this or any previous visit from the past 1095 days.      Assessment:     Patient with indication for CRTD implant for primary prevention of SCD. He has a wide LBBB likely from his ischemic injury, his EF will likely improve from CRT. Had an extensive discussion with patient regarding procedure, its risks as well as risks of not getting procedure done. He would like to think about it and will call our office when he is ready to schedule.

## 2024-01-11 ENCOUNTER — OFFICE VISIT (OUTPATIENT)
Dept: PRIMARY CARE | Facility: CLINIC | Age: 69
End: 2024-01-11
Payer: MEDICARE

## 2024-01-11 ENCOUNTER — OFFICE VISIT (OUTPATIENT)
Dept: CARDIOLOGY | Facility: CLINIC | Age: 69
End: 2024-01-11
Payer: MEDICARE

## 2024-01-11 VITALS
BODY MASS INDEX: 23.04 KG/M2 | HEIGHT: 68 IN | SYSTOLIC BLOOD PRESSURE: 96 MMHG | WEIGHT: 152 LBS | DIASTOLIC BLOOD PRESSURE: 58 MMHG | RESPIRATION RATE: 16 BRPM | OXYGEN SATURATION: 98 % | TEMPERATURE: 96.8 F | HEART RATE: 72 BPM

## 2024-01-11 VITALS
HEIGHT: 65 IN | HEART RATE: 68 BPM | BODY MASS INDEX: 25.16 KG/M2 | WEIGHT: 151 LBS | SYSTOLIC BLOOD PRESSURE: 113 MMHG | DIASTOLIC BLOOD PRESSURE: 69 MMHG

## 2024-01-11 DIAGNOSIS — I10 ESSENTIAL HYPERTENSION, BENIGN: ICD-10-CM

## 2024-01-11 DIAGNOSIS — N18.9 CHRONIC KIDNEY DISEASE, UNSPECIFIED CKD STAGE: Primary | ICD-10-CM

## 2024-01-11 DIAGNOSIS — I25.10 CORONARY ARTERY DISEASE INVOLVING NATIVE CORONARY ARTERY OF NATIVE HEART WITHOUT ANGINA PECTORIS: ICD-10-CM

## 2024-01-11 DIAGNOSIS — E11.9 DIABETES MELLITUS WITHOUT COMPLICATION (MULTI): ICD-10-CM

## 2024-01-11 DIAGNOSIS — E78.5 HYPERLIPIDEMIA, UNSPECIFIED HYPERLIPIDEMIA TYPE: ICD-10-CM

## 2024-01-11 DIAGNOSIS — I25.5 CARDIOMYOPATHY, ISCHEMIC: Primary | ICD-10-CM

## 2024-01-11 LAB — POC HEMOGLOBIN A1C: 8.6 % (ref 4.2–6.5)

## 2024-01-11 PROCEDURE — 1160F RVW MEDS BY RX/DR IN RCRD: CPT | Performed by: INTERNAL MEDICINE

## 2024-01-11 PROCEDURE — 1159F MED LIST DOCD IN RCRD: CPT | Performed by: INTERNAL MEDICINE

## 2024-01-11 PROCEDURE — 3074F SYST BP LT 130 MM HG: CPT | Performed by: INTERNAL MEDICINE

## 2024-01-11 PROCEDURE — 99214 OFFICE O/P EST MOD 30 MIN: CPT | Performed by: INTERNAL MEDICINE

## 2024-01-11 PROCEDURE — 3078F DIAST BP <80 MM HG: CPT | Performed by: INTERNAL MEDICINE

## 2024-01-11 PROCEDURE — 93000 ELECTROCARDIOGRAM COMPLETE: CPT | Performed by: INTERNAL MEDICINE

## 2024-01-11 PROCEDURE — 4010F ACE/ARB THERAPY RXD/TAKEN: CPT | Performed by: INTERNAL MEDICINE

## 2024-01-11 PROCEDURE — 1036F TOBACCO NON-USER: CPT | Performed by: INTERNAL MEDICINE

## 2024-01-11 PROCEDURE — 99204 OFFICE O/P NEW MOD 45 MIN: CPT | Performed by: INTERNAL MEDICINE

## 2024-01-11 PROCEDURE — 83036 HEMOGLOBIN GLYCOSYLATED A1C: CPT | Performed by: INTERNAL MEDICINE

## 2024-01-11 ASSESSMENT — ENCOUNTER SYMPTOMS
ALLERGIC/IMMUNOLOGIC NEGATIVE: 1
HEMATURIA: 0
DIZZINESS: 0
WHEEZING: 0
FATIGUE: 0
CHEST TIGHTNESS: 0
HEADACHES: 0
CONSTIPATION: 0
ACTIVITY CHANGE: 0
ADENOPATHY: 0
NUMBNESS: 0
BLOOD IN STOOL: 0
JOINT SWELLING: 0
FEVER: 0
PALPITATIONS: 0
PSYCHIATRIC NEGATIVE: 1
DYSURIA: 0
FREQUENCY: 0
WEAKNESS: 0
SHORTNESS OF BREATH: 0
COUGH: 0
ARTHRALGIAS: 1
ABDOMINAL PAIN: 0

## 2024-01-11 NOTE — PROGRESS NOTES
"Subjective   Patient ID: Thanh Martínez is a 68 y.o. male who presents for 3 MONTH FOLLOW UP.    He has moved from [facility] recently and wants to get established.         Review of Systems   Constitutional:  Negative for activity change, fatigue and fever.   HENT:  Negative for congestion.    Eyes:  Negative for visual disturbance.        He wears glasses   Respiratory:  Negative for cough, chest tightness, shortness of breath and wheezing.    Cardiovascular:  Negative for chest pain, palpitations and leg swelling.   Gastrointestinal:  Negative for abdominal pain, blood in stool and constipation.   Endocrine:        Diabetes II   Genitourinary:  Negative for dysuria, frequency and hematuria.   Musculoskeletal:  Positive for arthralgias. Negative for gait problem and joint swelling.        He has shoulder arthritis, goes for PT and takes otc pain meds   He had local Injections   Allergic/Immunologic: Negative.    Neurological:  Negative for dizziness, weakness, numbness and headaches.   Hematological:  Negative for adenopathy.   Psychiatric/Behavioral: Negative.         Objective   BP 96/58 (BP Location: Left arm, Patient Position: Sitting, BP Cuff Size: Adult)   Pulse 72   Temp 36 °C (96.8 °F) (Temporal)   Resp 16   Ht 1.727 m (5' 8\")   Wt 68.9 kg (152 lb)   SpO2 98%   BMI 23.11 kg/m²     Physical Exam  Constitutional:       Appearance: Normal appearance.   HENT:      Head: Normocephalic and atraumatic.      Nose: No congestion.      Mouth/Throat:      Mouth: Mucous membranes are moist.      Pharynx: Oropharynx is clear.   Eyes:      Extraocular Movements: Extraocular movements intact.      Conjunctiva/sclera: Conjunctivae normal.      Pupils: Pupils are equal, round, and reactive to light.   Cardiovascular:      Rate and Rhythm: Normal rate and regular rhythm.      Pulses: Normal pulses.      Heart sounds: No murmur heard.  Pulmonary:      Effort: Pulmonary effort is normal.      Breath sounds: Normal " breath sounds. No wheezing or rales.   Abdominal:      General: Abdomen is flat. Bowel sounds are normal.      Palpations: Abdomen is soft.      Hernia: No hernia is present.   Musculoskeletal:      Cervical back: Normal range of motion and neck supple.      Right lower leg: No edema.      Left lower leg: No edema.      Comments: Reduced movement of both shoulders , reduced elevation   Skin:     General: Skin is warm and dry.      Capillary Refill: Capillary refill takes more than 3 seconds.      Findings: No rash.   Neurological:      General: No focal deficit present.      Mental Status: He is alert and oriented to person, place, and time.   Psychiatric:         Mood and Affect: Mood normal.         Behavior: Behavior normal.         Assessment/Plan     Problem List Items Addressed This Visit       Coronary artery disease involving native coronary artery of native heart without angina pectoris - Primary    Relevant Medications    prasugrel (Effient) 10 mg tablet    metoprolol succinate XL (Toprol-XL) 25 mg 24 hr tablet        Referred  to Cardiology , Dr Jonathan Jones   He had Nuclear stress test  in November 2023    Ischemic cardiomyopathy:  EF 35% s/p LAD infarction (large). Consider CRT-D.  - Referral to Cardiac Electrophysiology; Future    Diabetes mellitus without complication (CMS/HCC)    Diabetic diet, Follow A1C , it was 8 in October , 8.6 now  Monitor BS, Justin has Freestyle Abdullahi TD  Stop Metformin po bid due to creatinine of 1.68  Take Jardiance 25mg daily  Consider Ozempic ?  Refer to Endocrine , Dr Hernandez      Relevant Orders    CBC and Auto Differential    Comprehensive metabolic panel    Lipid panel    Hemoglobin A1c    Tsh With Reflex To Free T4 If Abnormal    Albumin, urine, random      Pure hypercholesterolemia  Statin, cardiac diet    History of heart artery stent  Effient, Cardiology referral      Primary hypertension  Benicar , Metoprolol Succinate XL      Primary osteoarthritis of both  shoulders  Continue PT   He takes Naprosyn as needed  Stretching exercises       CKD 2:  Creatinine around 1.6  Refer to Nephrology       Flushot high dose   Covid booster at pharmacy  He had colonoscopy twice before

## 2024-01-12 RX ORDER — ATORVASTATIN CALCIUM 80 MG/1
80 TABLET, FILM COATED ORAL
Qty: 90 TABLET | Refills: 3 | Status: SHIPPED | OUTPATIENT
Start: 2024-01-12 | End: 2024-04-12 | Stop reason: SINTOL

## 2024-01-12 RX ORDER — EZETIMIBE 10 MG/1
10 TABLET ORAL
Qty: 90 TABLET | Refills: 3 | Status: SHIPPED | OUTPATIENT
Start: 2024-01-12 | End: 2025-01-11

## 2024-01-12 RX ORDER — EMPAGLIFLOZIN 25 MG/1
25 TABLET, FILM COATED ORAL DAILY
Qty: 90 TABLET | Refills: 3 | Status: SHIPPED | OUTPATIENT
Start: 2024-01-12

## 2024-01-12 RX ORDER — OLMESARTAN MEDOXOMIL 20 MG/1
20 TABLET ORAL DAILY
Qty: 90 TABLET | Refills: 3 | Status: SHIPPED | OUTPATIENT
Start: 2024-01-12

## 2024-01-12 RX ORDER — METOPROLOL SUCCINATE 25 MG/1
TABLET, EXTENDED RELEASE ORAL
Qty: 135 TABLET | Refills: 3 | Status: SHIPPED | OUTPATIENT
Start: 2024-01-12

## 2024-03-19 ENCOUNTER — LAB (OUTPATIENT)
Dept: LAB | Facility: LAB | Age: 69
End: 2024-03-19
Payer: MEDICARE

## 2024-03-19 DIAGNOSIS — N18.9 CHRONIC KIDNEY DISEASE, UNSPECIFIED: Primary | ICD-10-CM

## 2024-03-19 LAB
ALBUMIN SERPL BCP-MCNC: 4.2 G/DL (ref 3.4–5)
ANION GAP SERPL CALC-SCNC: 14 MMOL/L (ref 10–20)
BUN SERPL-MCNC: 31 MG/DL (ref 6–23)
C3 SERPL-MCNC: 135 MG/DL (ref 87–200)
C4 SERPL-MCNC: 39 MG/DL (ref 10–50)
CALCIUM SERPL-MCNC: 9.6 MG/DL (ref 8.6–10.3)
CHLORIDE SERPL-SCNC: 104 MMOL/L (ref 98–107)
CO2 SERPL-SCNC: 23 MMOL/L (ref 21–32)
COLLECT DURATION TIME SPEC: 24 HRS
COLLECT DURATION TIME SPEC: 24 HRS
CREAT 24H UR-MCNC: 63.3 MG/DL (ref 20–370)
CREAT 24H UR-MCNC: 63.3 MG/DL (ref 20–370)
CREAT 24H UR-MRATE: 1.75 G/24 H (ref 0.87–2.41)
CREAT 24H UR-MRATE: 1.75 G/24 H (ref 0.87–2.41)
CREAT CL 24H UR+SERPL-VRATE: 76 ML/MIN
CREAT SERPL-MCNC: 1.6 MG/DL (ref 0.5–1.3)
CREAT SERPL-MCNC: 1.6 MG/DL (ref 0.5–1.3)
EGFRCR SERPLBLD CKD-EPI 2021: 46 ML/MIN/1.73M*2
EGFRCR SERPLBLD CKD-EPI 2021: 46 ML/MIN/1.73M*2
GLUCOSE SERPL-MCNC: 140 MG/DL (ref 74–99)
HBV SURFACE AG SERPL QL IA: NONREACTIVE
HCV AB SER QL: NONREACTIVE
PHOSPHATE SERPL-MCNC: 3.9 MG/DL (ref 2.5–4.9)
POTASSIUM SERPL-SCNC: 4.5 MMOL/L (ref 3.5–5.3)
PROT 24H UR-MCNC: 4 MG/DL (ref 5–25)
PROT 24H UR-MRATE: 111 MG/24H (ref 0–149)
PROT SERPL-MCNC: 6.9 G/DL (ref 6.4–8.2)
SODIUM SERPL-SCNC: 136 MMOL/L (ref 136–145)
SPECIMEN VOL 24H UR: 2770 ML
SPECIMEN VOL 24H UR: 2770 ML
URATE SERPL-MCNC: 8.2 MG/DL (ref 4–7.5)

## 2024-03-19 PROCEDURE — 80069 RENAL FUNCTION PANEL: CPT

## 2024-03-19 PROCEDURE — 84550 ASSAY OF BLOOD/URIC ACID: CPT

## 2024-03-19 PROCEDURE — 82575 CREATININE CLEARANCE TEST: CPT

## 2024-03-19 PROCEDURE — 84165 PROTEIN E-PHORESIS SERUM: CPT | Performed by: PATHOLOGY

## 2024-03-19 PROCEDURE — 81050 URINALYSIS VOLUME MEASURE: CPT

## 2024-03-19 PROCEDURE — 82570 ASSAY OF URINE CREATININE: CPT

## 2024-03-19 PROCEDURE — 86335 IMMUNFIX E-PHORSIS/URINE/CSF: CPT

## 2024-03-19 PROCEDURE — 84166 PROTEIN E-PHORESIS/URINE/CSF: CPT | Performed by: STUDENT IN AN ORGANIZED HEALTH CARE EDUCATION/TRAINING PROGRAM

## 2024-03-19 PROCEDURE — 86160 COMPLEMENT ANTIGEN: CPT

## 2024-03-19 PROCEDURE — 86038 ANTINUCLEAR ANTIBODIES: CPT

## 2024-03-19 PROCEDURE — 84165 PROTEIN E-PHORESIS SERUM: CPT

## 2024-03-19 PROCEDURE — 86225 DNA ANTIBODY NATIVE: CPT

## 2024-03-19 PROCEDURE — 82565 ASSAY OF CREATININE: CPT

## 2024-03-19 PROCEDURE — 36415 COLL VENOUS BLD VENIPUNCTURE: CPT

## 2024-03-19 PROCEDURE — 84156 ASSAY OF PROTEIN URINE: CPT

## 2024-03-19 PROCEDURE — 86320 SERUM IMMUNOELECTROPHORESIS: CPT | Performed by: PATHOLOGY

## 2024-03-19 PROCEDURE — 86803 HEPATITIS C AB TEST: CPT

## 2024-03-19 PROCEDURE — 84166 PROTEIN E-PHORESIS/URINE/CSF: CPT

## 2024-03-19 PROCEDURE — 84155 ASSAY OF PROTEIN SERUM: CPT

## 2024-03-19 PROCEDURE — 86325 OTHER IMMUNOELECTROPHORESIS: CPT | Performed by: STUDENT IN AN ORGANIZED HEALTH CARE EDUCATION/TRAINING PROGRAM

## 2024-03-19 PROCEDURE — 87340 HEPATITIS B SURFACE AG IA: CPT

## 2024-03-19 PROCEDURE — 86334 IMMUNOFIX E-PHORESIS SERUM: CPT

## 2024-03-19 PROCEDURE — 86235 NUCLEAR ANTIGEN ANTIBODY: CPT

## 2024-03-20 LAB
ANA PATTERN: ABNORMAL
ANA SER QL HEP2 SUBST: POSITIVE
ANA TITR SER IF: ABNORMAL {TITER}
CENTROMERE B AB SER-ACNC: <0.2 AI
CHROMATIN AB SERPL-ACNC: <0.2 AI
DSDNA AB SER-ACNC: <1 IU/ML
ENA JO1 AB SER QL IA: <0.2 AI
ENA RNP AB SER IA-ACNC: 0.3 AI
ENA SCL70 AB SER QL IA: <0.2 AI
ENA SM AB SER IA-ACNC: <0.2 AI
ENA SM+RNP AB SER QL IA: <0.2 AI
ENA SS-A AB SER IA-ACNC: <0.2 AI
ENA SS-B AB SER IA-ACNC: <0.2 AI
RIBOSOMAL P AB SER-ACNC: <0.2 AI

## 2024-03-22 LAB
ALBUMIN: 4 G/DL (ref 3.4–5)
ALPHA 1 GLOBULIN: 0.2 G/DL (ref 0.2–0.6)
ALPHA 2 GLOBULIN: 0.8 G/DL (ref 0.4–1.1)
BETA GLOBULIN: 0.9 G/DL (ref 0.5–1.2)
GAMMA GLOBULIN: 1 G/DL (ref 0.5–1.4)
IMMUNOFIXATION COMMENT: NORMAL
PATH REVIEW - SERUM IMMUNOFIXATION: NORMAL
PATH REVIEW-SERUM PROTEIN ELECTROPHORESIS: NORMAL
PROTEIN ELECTROPHORESIS COMMENT: NORMAL

## 2024-03-26 LAB
ALBUMIN MFR UR ELPH: 30.8 %
ALPHA1 GLOB MFR UR ELPH: 10.5 %
ALPHA2 GLOB MFR UR ELPH: 16 %
B-GLOBULIN MFR UR ELPH: 26.1 %
GAMMA GLOB MFR UR ELPH: 16.6 %
IMMUNOFIXATION COMMENT: NORMAL
PATH REVIEW - URINE IMMUNOFIXATION: NORMAL
PATH REVIEW-URINE PROTEIN ELECTROPHORESIS: NORMAL
URINE ELECTROPHORESIS COMMENT: NORMAL

## 2024-04-11 ENCOUNTER — OFFICE VISIT (OUTPATIENT)
Dept: PRIMARY CARE | Facility: CLINIC | Age: 69
End: 2024-04-11
Payer: MEDICARE

## 2024-04-11 ENCOUNTER — LAB (OUTPATIENT)
Dept: LAB | Facility: LAB | Age: 69
End: 2024-04-11
Payer: MEDICARE

## 2024-04-11 VITALS
DIASTOLIC BLOOD PRESSURE: 72 MMHG | HEIGHT: 65 IN | BODY MASS INDEX: 25.16 KG/M2 | SYSTOLIC BLOOD PRESSURE: 126 MMHG | HEART RATE: 67 BPM | WEIGHT: 151 LBS | OXYGEN SATURATION: 100 %

## 2024-04-11 DIAGNOSIS — I10 ESSENTIAL HYPERTENSION, BENIGN: ICD-10-CM

## 2024-04-11 DIAGNOSIS — E11.9 DIABETES MELLITUS WITHOUT COMPLICATION (MULTI): ICD-10-CM

## 2024-04-11 DIAGNOSIS — M25.552 PAIN OF LEFT HIP: ICD-10-CM

## 2024-04-11 DIAGNOSIS — I25.10 CORONARY ARTERY DISEASE INVOLVING NATIVE CORONARY ARTERY OF NATIVE HEART WITHOUT ANGINA PECTORIS: ICD-10-CM

## 2024-04-11 DIAGNOSIS — M51.36 DEGENERATION OF LUMBAR INTERVERTEBRAL DISC: ICD-10-CM

## 2024-04-11 DIAGNOSIS — E11.9 DIABETES MELLITUS WITHOUT COMPLICATION (MULTI): Primary | ICD-10-CM

## 2024-04-11 LAB
ALBUMIN SERPL BCP-MCNC: 4.1 G/DL (ref 3.4–5)
ALP SERPL-CCNC: 50 U/L (ref 33–136)
ALT SERPL W P-5'-P-CCNC: 178 U/L (ref 10–52)
ANION GAP SERPL CALC-SCNC: 12 MMOL/L (ref 10–20)
AST SERPL W P-5'-P-CCNC: 98 U/L (ref 9–39)
BASOPHILS # BLD AUTO: 0.05 X10*3/UL (ref 0–0.1)
BASOPHILS NFR BLD AUTO: 0.6 %
BILIRUB SERPL-MCNC: 0.8 MG/DL (ref 0–1.2)
BUN SERPL-MCNC: 30 MG/DL (ref 6–23)
CALCIUM SERPL-MCNC: 9.8 MG/DL (ref 8.6–10.3)
CHLORIDE SERPL-SCNC: 103 MMOL/L (ref 98–107)
CO2 SERPL-SCNC: 23 MMOL/L (ref 21–32)
CREAT SERPL-MCNC: 1.55 MG/DL (ref 0.5–1.3)
EGFRCR SERPLBLD CKD-EPI 2021: 48 ML/MIN/1.73M*2
EOSINOPHIL # BLD AUTO: 0.05 X10*3/UL (ref 0–0.7)
EOSINOPHIL NFR BLD AUTO: 0.6 %
ERYTHROCYTE [DISTWIDTH] IN BLOOD BY AUTOMATED COUNT: 12.4 % (ref 11.5–14.5)
GLUCOSE SERPL-MCNC: 143 MG/DL (ref 74–99)
HCT VFR BLD AUTO: 44.1 % (ref 41–52)
HGB BLD-MCNC: 14.5 G/DL (ref 13.5–17.5)
IMM GRANULOCYTES # BLD AUTO: 0.02 X10*3/UL (ref 0–0.7)
IMM GRANULOCYTES NFR BLD AUTO: 0.2 % (ref 0–0.9)
LYMPHOCYTES # BLD AUTO: 2.51 X10*3/UL (ref 1.2–4.8)
LYMPHOCYTES NFR BLD AUTO: 31.3 %
MCH RBC QN AUTO: 31.6 PG (ref 26–34)
MCHC RBC AUTO-ENTMCNC: 32.9 G/DL (ref 32–36)
MCV RBC AUTO: 96 FL (ref 80–100)
MONOCYTES # BLD AUTO: 0.76 X10*3/UL (ref 0.1–1)
MONOCYTES NFR BLD AUTO: 9.5 %
NEUTROPHILS # BLD AUTO: 4.62 X10*3/UL (ref 1.2–7.7)
NEUTROPHILS NFR BLD AUTO: 57.8 %
NRBC BLD-RTO: 0 /100 WBCS (ref 0–0)
PLATELET # BLD AUTO: 219 X10*3/UL (ref 150–450)
POTASSIUM SERPL-SCNC: 4.9 MMOL/L (ref 3.5–5.3)
PROT SERPL-MCNC: 7 G/DL (ref 6.4–8.2)
RBC # BLD AUTO: 4.59 X10*6/UL (ref 4.5–5.9)
SODIUM SERPL-SCNC: 133 MMOL/L (ref 136–145)
WBC # BLD AUTO: 8 X10*3/UL (ref 4.4–11.3)

## 2024-04-11 PROCEDURE — 99214 OFFICE O/P EST MOD 30 MIN: CPT | Performed by: INTERNAL MEDICINE

## 2024-04-11 PROCEDURE — G0439 PPPS, SUBSEQ VISIT: HCPCS | Performed by: INTERNAL MEDICINE

## 2024-04-11 PROCEDURE — 3078F DIAST BP <80 MM HG: CPT | Performed by: INTERNAL MEDICINE

## 2024-04-11 PROCEDURE — 3074F SYST BP LT 130 MM HG: CPT | Performed by: INTERNAL MEDICINE

## 2024-04-11 PROCEDURE — 4010F ACE/ARB THERAPY RXD/TAKEN: CPT | Performed by: INTERNAL MEDICINE

## 2024-04-11 PROCEDURE — 83036 HEMOGLOBIN GLYCOSYLATED A1C: CPT

## 2024-04-11 PROCEDURE — 80053 COMPREHEN METABOLIC PANEL: CPT

## 2024-04-11 PROCEDURE — 36415 COLL VENOUS BLD VENIPUNCTURE: CPT

## 2024-04-11 PROCEDURE — 1159F MED LIST DOCD IN RCRD: CPT | Performed by: INTERNAL MEDICINE

## 2024-04-11 PROCEDURE — 3061F NEG MICROALBUMINURIA REV: CPT | Performed by: INTERNAL MEDICINE

## 2024-04-11 PROCEDURE — 85025 COMPLETE CBC W/AUTO DIFF WBC: CPT

## 2024-04-11 ASSESSMENT — ENCOUNTER SYMPTOMS
BLOOD IN STOOL: 0
WEAKNESS: 0
CONSTIPATION: 0
DYSURIA: 0
ADENOPATHY: 0
FATIGUE: 0
FEVER: 0
HEADACHES: 0
PALPITATIONS: 0
COUGH: 0
MYALGIAS: 1
SHORTNESS OF BREATH: 0
ACTIVITY CHANGE: 0
ARTHRALGIAS: 1
FREQUENCY: 0
EYE REDNESS: 0
AGITATION: 0
ENDOCRINE NEGATIVE: 1
WHEEZING: 0
JOINT SWELLING: 0
ABDOMINAL PAIN: 0
DIZZINESS: 0
BACK PAIN: 0
NUMBNESS: 0
ALLERGIC/IMMUNOLOGIC NEGATIVE: 1

## 2024-04-11 NOTE — PROGRESS NOTES
"Chief Complaint:   Medicare Wellness Exam/Comprehensive Problem Focused Follow Up and Physical Exam    HPI:  He is doing well, feels OK.  He has Freestyle Abdullahi, and his 30 day avg. BS is 141  He has chronic left shoulder discomfort , and has new left inner thig pain, and leg cramps ,  Has trouble getting in and out of car. He has seen Endocrine Dr , and he takes Jardiance and Metformin ER daily. He saw Dr Escobedo, Nephrology also and he follows with Dr Jones, Cardiology. He was advised to get a Pacer device, but he is reluctant and requests for second opinion. He had dental work done recently.      Review of Systems   Constitutional:  Negative for activity change, fatigue and fever.   HENT:  Negative for congestion.    Eyes:  Negative for redness and visual disturbance.   Respiratory:  Negative for cough, shortness of breath and wheezing.    Cardiovascular:  Negative for chest pain, palpitations and leg swelling.   Gastrointestinal:  Negative for abdominal pain, blood in stool and constipation.   Endocrine: Negative.    Genitourinary:  Negative for dysuria, frequency and urgency.   Musculoskeletal:  Positive for arthralgias and myalgias. Negative for back pain and joint swelling.        Left shoulder discomfort , reduced ROM  Left inner thigh discomfort , getting in and out of car   Skin:  Negative for rash.   Allergic/Immunologic: Negative.    Neurological:  Negative for dizziness, weakness, numbness and headaches.   Hematological:  Negative for adenopathy.   Psychiatric/Behavioral:  Negative for agitation and behavioral problems.          /72 (BP Location: Left arm, Patient Position: Sitting, BP Cuff Size: Adult)   Pulse 67   Ht 1.651 m (5' 5\")   Wt 68.5 kg (151 lb)   SpO2 100%   BMI 25.13 kg/m²    Physical Exam  Constitutional:       Appearance: Normal appearance.   HENT:      Head: Normocephalic and atraumatic.      Right Ear: Tympanic membrane and external ear normal.      Left Ear: Tympanic membrane " and external ear normal.      Nose: No congestion.      Mouth/Throat:      Mouth: Mucous membranes are moist.      Pharynx: Oropharynx is clear.   Eyes:      Extraocular Movements: Extraocular movements intact.      Conjunctiva/sclera: Conjunctivae normal.      Pupils: Pupils are equal, round, and reactive to light.      Comments: He wears glasses   Cardiovascular:      Rate and Rhythm: Normal rate and regular rhythm.      Pulses: Normal pulses.      Heart sounds: Normal heart sounds. No murmur heard.  Pulmonary:      Effort: Pulmonary effort is normal.      Breath sounds: Normal breath sounds. No wheezing or rales.   Abdominal:      General: Abdomen is flat. Bowel sounds are normal.      Palpations: Abdomen is soft.      Hernia: No hernia is present.   Musculoskeletal:         General: No swelling or tenderness. Normal range of motion.      Cervical back: Normal range of motion and neck supple.      Right lower leg: No edema.      Left lower leg: No edema.      Comments: Reduced ROM of left shoulder , with pain  Normal gait , No hip , L spine tenderness   Skin:     General: Skin is warm and dry.      Capillary Refill: Capillary refill takes less than 2 seconds.      Findings: No rash.   Neurological:      General: No focal deficit present.      Mental Status: He is alert and oriented to person, place, and time.      Sensory: No sensory deficit.      Motor: No weakness.      Coordination: Coordination normal.      Gait: Gait normal.   Psychiatric:         Mood and Affect: Mood normal.         Behavior: Behavior normal.         Assessment and Plan:    Left inner thigh discomfort , cramps  Tylenol for comfort, Heat   XR of left hip and L spine , ordered  Consider short course of PT and Orthopedic eval       Coronary artery disease involving native coronary artery of native heart without angina pectoris - Primary    Relevant Medications    prasugrel (Effient) 10 mg tablet    metoprolol succinate XL (Toprol-XL) 25 mg 24  hr tablet         Take only one tab daily ( not 1 1/2 tab )    He follows with  Cardiology , Dr Jonathan Jones   He had Nuclear stress test  in November 2023    Ischemic cardiomyopathy:  EF 35% s/p LAD infarction (large). Consider CRT-D.  He is reluctant to get pacer, requests for second opinion  - Referral to Cardiac Electrophysiology; Future    Diabetes mellitus without complication (CMS/[facility])    Diabetic diet, Follow A1C , it has improved now  Monitor BS, He has Freestyle Abdullahi TD  He is now taking metformin ER , since his creatinine has improved  Take Jardiance 25mg daily  Consider Ozempic ?  Refer to Endocrine , He is following with Dr Annita Peña  Visit of 2/13/24 reviewed                                  Pure hypercholesterolemia  Statin, and Zetia , cardiac diet    History of heart artery stent  Effient,and followup with  Cardiology       Primary hypertension  Benicar , Metoprolol Succinate XL      Primary osteoarthritis of both shoulders  Continue PT   He takes Naprosyn as needed  Stretching exercises  He had local Injection before       CKD 2:  Monitor renal function, and electolytes  He follows with Nephrology , Dr Favian Escobedo  He had extensive serological workup , including SPEP / UPEP , JOCELYN panel, complement levels   And Hep B and C serology , all were normal       Flushot high dose in Fall   Covid booster at pharmacy , he had  He had colonoscopy twice before  He had pneumovax and Shingrix  RSV vaccine   Check labs           Active Problem List  Patient Active Problem List   Diagnosis    Coronary artery disease involving native coronary artery of native heart without angina pectoris    Diabetes mellitus without complication (CMS/HCC)    Pure hypercholesterolemia    Primary hypertension    Primary osteoarthritis of both shoulders    Cervical disc disorder with radiculopathy    STEMI (ST elevation myocardial infarction) (CMS/HCC)    Diabetes mellitus (CMS/[facility])    Status post insertion of drug-eluting  stent into left anterior descending (LAD) artery    Presence of drug-eluting stent in left circumflex coronary artery    Cardiomyopathy, ischemic       Comprehensive Medical/Surgical/Social/Family History  Past Medical History:   Diagnosis Date    Cardiomyopathy, ischemic 11/15/2023    Coronary artery disease involving native coronary artery of native heart without angina pectoris 10/11/2023    Presence of drug-eluting stent in left circumflex coronary artery 11/09/2023    Staged PCI OM    Status post insertion of drug-eluting stent into left anterior descending (LAD) artery 11/09/2023     Past Surgical History:   Procedure Laterality Date    CAROTID STENT       mented in Social Documentation section of the chart and medication list as appropriate    Allergies and Medications  Patient has no known allergies.  Current Outpatient Medications   Medication Instructions    atorvastatin (LIPITOR) 80 mg, oral, Daily RT    ezetimibe (ZETIA) 10 mg, oral, Daily RT    FreeStyle Abdullahi sensor system (FreeStyle Abdullahi 2 Sensor) kit Use as instructed    Jardiance 25 mg, oral, Daily    metoprolol succinate XL (Toprol-XL) 25 mg 24 hr tablet 1 1/2 tab by mouth daily    nitroglycerin (Nitrostat) 0.4 mg SL tablet Place 1 tablet (0.4 mg total) under the tongue every 5 (five) minutes as needed for Chest pain. Please contact provider if chest pain does not go away after taking 3 consecutive doses.    olmesartan (BENICAR) 20 mg, oral, Daily    prasugrel (EFFIENT) 10 mg, oral, Daily RT     Medications and Supplements  prescribed by me and other practitioners or clinical pharmacist (such as prescriptions, OTC's, herbal therapies and supplements) were reviewed and documented in the medical record.      Activities of Daily Living  In your present state of health, do you have any difficulty performing the following activities?:   Preparing food and eating?: Yes  Bathing yourself: Yes  Getting dressed: Yes  Using the toilet:Yes  Moving around  from place to place: Yes  In the past year have you fallen or had a near fall?:No  Able to manage finances independently: Yes  Able to perform grocery shopping: Yes  Able to manage medications independently: Yes  Able to do housework independently: Yes  Patient self-assessment of health status? Good    Depression Screen  Depression screening (15m) completed using the PHQ-2 questions with results documented in the chart/encounter  (See note and/or Rooming Screenings for documentation)    Current exercise habits: light activities   Dietary issues discussed: Yes  Hearing difficulties: No  Safe in current home environment: Yes  Visual Acuity assessed: No  Cognitive Impairment No    Advance directives  Advanced Care Planning (including a Living Will, Healthcare POA, as well as specific end of life choices and/or directives), was discussed (~16 min) with the patient and/or surrogate, voluntarily, and details of that discussion documented in the Problem List (under Advanced Directives Discussion) of the medical record.   Advised to get a Living Will

## 2024-04-12 ENCOUNTER — TELEPHONE (OUTPATIENT)
Dept: PRIMARY CARE | Facility: CLINIC | Age: 69
End: 2024-04-12
Payer: MEDICARE

## 2024-04-12 DIAGNOSIS — R74.8 ABNORMAL LIVER ENZYMES: Primary | ICD-10-CM

## 2024-04-12 LAB
EST. AVERAGE GLUCOSE BLD GHB EST-MCNC: 171 MG/DL
HBA1C MFR BLD: 7.6 %

## 2024-04-12 NOTE — TELEPHONE ENCOUNTER
----- Message from Travis Bhandari MD sent at 4/12/2024  9:24 AM EDT -----  Also , STOP taking statin and repeat LFTS after a month , I have talked to him

## 2024-04-12 NOTE — RESULT ENCOUNTER NOTE
Discussed results with patient  New Order placed for liver US and referral to GI , Dr Perez at [facility]

## 2024-04-18 ENCOUNTER — APPOINTMENT (OUTPATIENT)
Dept: RADIOLOGY | Facility: CLINIC | Age: 69
End: 2024-04-18
Payer: MEDICARE

## 2024-04-19 ENCOUNTER — HOSPITAL ENCOUNTER (OUTPATIENT)
Dept: RADIOLOGY | Facility: CLINIC | Age: 69
Discharge: HOME | End: 2024-04-19
Payer: MEDICARE

## 2024-04-19 DIAGNOSIS — R74.8 ABNORMAL LIVER ENZYMES: ICD-10-CM

## 2024-04-19 PROCEDURE — 76705 ECHO EXAM OF ABDOMEN: CPT | Performed by: RADIOLOGY

## 2024-04-19 PROCEDURE — 76705 ECHO EXAM OF ABDOMEN: CPT

## 2024-05-28 PROBLEM — E55.9 VITAMIN D DEFICIENCY: Status: ACTIVE | Noted: 2023-10-16

## 2024-05-28 PROBLEM — M51.36 DEGENERATION OF INTERVERTEBRAL DISC OF LUMBAR REGION: Status: ACTIVE | Noted: 2024-05-28

## 2024-05-28 PROBLEM — N18.9 CHRONIC KIDNEY DISEASE: Status: ACTIVE | Noted: 2024-03-19

## 2024-05-28 PROBLEM — M51.369 DEGENERATION OF INTERVERTEBRAL DISC OF LUMBAR REGION: Status: ACTIVE | Noted: 2024-05-28

## 2024-06-24 ENCOUNTER — APPOINTMENT (OUTPATIENT)
Dept: CARDIOLOGY | Facility: CLINIC | Age: 69
End: 2024-06-24
Payer: MEDICARE

## 2024-06-24 NOTE — PROGRESS NOTES
Referred by Thony CANCHOLA ***    Past Medical History:  Problem List Items Addressed This Visit    None       Past Medical History:   Diagnosis Date    Cardiomyopathy, ischemic 11/15/2023    Coronary artery disease involving native coronary artery of native heart without angina pectoris 10/11/2023    Presence of drug-eluting stent in left circumflex coronary artery 11/09/2023    Staged PCI OM    Status post insertion of drug-eluting stent into left anterior descending (LAD) artery 11/09/2023             Past Surgical History:  He has a past surgical history that includes Carotid stent.      Social History:  He reports that he has never smoked. He has never used smokeless tobacco. He reports that he does not drink alcohol and does not use drugs.    Family History:  No family history on file.     Allergies:  Patient has no known allergies.    Outpatient Medications:  Current Outpatient Medications   Medication Instructions    ezetimibe (ZETIA) 10 mg, oral, Daily RT    FreeStyle Abdullahi sensor system (FreeStyle Abdullahi 2 Sensor) kit Use as instructed    Jardiance 25 mg, oral, Daily    metoprolol succinate XL (Toprol-XL) 25 mg 24 hr tablet 1 1/2 tab by mouth daily    nitroglycerin (Nitrostat) 0.4 mg SL tablet Place 1 tablet (0.4 mg total) under the tongue every 5 (five) minutes as needed for Chest pain. Please contact provider if chest pain does not go away after taking 3 consecutive doses.    olmesartan (BENICAR) 20 mg, oral, Daily    prasugrel (EFFIENT) 10 mg, oral, Daily RT        Last Recorded Vitals:  There were no vitals filed for this visit.    Physical Exam    Physical  Patient is alert and oriented x3.  HEENT is unremarkable mucous members are moist  Neck no JVP no bruits upstrokes are full no thyromegaly  Lungs are clear bilaterally.  No wheezing crackles or rales  Heart regular rhythm normal S1-S2 there is no S3 no murmurs are heard.  Abdomen is soft vessels are positive nontender nondistended no organomegaly no  pulsatile masses  Extremities have no edema.  Distal pulses present palpable.  Neuro is grossly nonfocal  Skin has no rashes     Last Labs:  CBC -  Lab Results   Component Value Date    WBC 8.0 04/11/2024    HGB 14.5 04/11/2024    HCT 44.1 04/11/2024    MCV 96 04/11/2024     04/11/2024       CMP -  Lab Results   Component Value Date    CALCIUM 9.8 04/11/2024    PHOS 3.9 03/19/2024    PROT 7.0 04/11/2024    ALBUMIN 4.1 04/11/2024    AST 98 (H) 04/11/2024     (H) 04/11/2024    ALKPHOS 50 04/11/2024    BILITOT 0.8 04/11/2024       LIPID PANEL -   Lab Results   Component Value Date    CHOL 93 10/16/2023    HDL 42.3 10/16/2023    CHHDL 2.2 10/16/2023    VLDL 19 10/16/2023    TRIG 97 10/16/2023    NHDL 51 10/16/2023       RENAL FUNCTION PANEL -   Lab Results   Component Value Date    K 4.9 04/11/2024    PHOS 3.9 03/19/2024       Lab Results   Component Value Date    HGBA1C 7.6 (H) 04/11/2024    HGBA1C 8.6 (A) 01/11/2024              Assessment/Plan       Elpidio King MD     Instructions and follow up

## 2024-07-16 ENCOUNTER — LAB (OUTPATIENT)
Dept: LAB | Facility: LAB | Age: 69
End: 2024-07-16
Payer: MEDICARE

## 2024-07-16 ENCOUNTER — APPOINTMENT (OUTPATIENT)
Dept: PRIMARY CARE | Facility: CLINIC | Age: 69
End: 2024-07-16
Payer: MEDICARE

## 2024-07-16 VITALS
OXYGEN SATURATION: 95 % | HEIGHT: 65 IN | DIASTOLIC BLOOD PRESSURE: 58 MMHG | TEMPERATURE: 97.2 F | RESPIRATION RATE: 18 BRPM | WEIGHT: 155 LBS | BODY MASS INDEX: 25.83 KG/M2 | SYSTOLIC BLOOD PRESSURE: 118 MMHG | HEART RATE: 82 BPM

## 2024-07-16 DIAGNOSIS — R74.8 ABNORMAL LIVER ENZYMES: ICD-10-CM

## 2024-07-16 DIAGNOSIS — E11.9 DIABETES MELLITUS WITHOUT COMPLICATION (MULTI): Primary | ICD-10-CM

## 2024-07-16 LAB
ALBUMIN SERPL BCP-MCNC: 4.1 G/DL (ref 3.4–5)
ALP SERPL-CCNC: 28 U/L (ref 33–136)
ALT SERPL W P-5'-P-CCNC: 23 U/L (ref 10–52)
ANION GAP SERPL CALC-SCNC: 10 MMOL/L (ref 10–20)
AST SERPL W P-5'-P-CCNC: 17 U/L (ref 9–39)
BILIRUB SERPL-MCNC: 0.6 MG/DL (ref 0–1.2)
BUN SERPL-MCNC: 31 MG/DL (ref 6–23)
CALCIUM SERPL-MCNC: 10 MG/DL (ref 8.6–10.3)
CHLORIDE SERPL-SCNC: 105 MMOL/L (ref 98–107)
CO2 SERPL-SCNC: 27 MMOL/L (ref 21–32)
CREAT SERPL-MCNC: 1.53 MG/DL (ref 0.5–1.3)
EGFRCR SERPLBLD CKD-EPI 2021: 49 ML/MIN/1.73M*2
GLUCOSE SERPL-MCNC: 186 MG/DL (ref 74–99)
POC HEMOGLOBIN A1C: 7 % (ref 4.2–6.5)
POTASSIUM SERPL-SCNC: 4.2 MMOL/L (ref 3.5–5.3)
PROT SERPL-MCNC: 6.6 G/DL (ref 6.4–8.2)
SODIUM SERPL-SCNC: 138 MMOL/L (ref 136–145)

## 2024-07-16 PROCEDURE — 1036F TOBACCO NON-USER: CPT | Performed by: INTERNAL MEDICINE

## 2024-07-16 PROCEDURE — 3051F HG A1C>EQUAL 7.0%<8.0%: CPT | Performed by: INTERNAL MEDICINE

## 2024-07-16 PROCEDURE — 83036 HEMOGLOBIN GLYCOSYLATED A1C: CPT | Performed by: INTERNAL MEDICINE

## 2024-07-16 PROCEDURE — 99214 OFFICE O/P EST MOD 30 MIN: CPT | Performed by: INTERNAL MEDICINE

## 2024-07-16 PROCEDURE — 3061F NEG MICROALBUMINURIA REV: CPT | Performed by: INTERNAL MEDICINE

## 2024-07-16 PROCEDURE — 3074F SYST BP LT 130 MM HG: CPT | Performed by: INTERNAL MEDICINE

## 2024-07-16 PROCEDURE — 3078F DIAST BP <80 MM HG: CPT | Performed by: INTERNAL MEDICINE

## 2024-07-16 PROCEDURE — 4010F ACE/ARB THERAPY RXD/TAKEN: CPT | Performed by: INTERNAL MEDICINE

## 2024-07-16 PROCEDURE — 80053 COMPREHEN METABOLIC PANEL: CPT

## 2024-07-16 PROCEDURE — 1159F MED LIST DOCD IN RCRD: CPT | Performed by: INTERNAL MEDICINE

## 2024-07-16 PROCEDURE — 36415 COLL VENOUS BLD VENIPUNCTURE: CPT

## 2024-07-16 ASSESSMENT — ENCOUNTER SYMPTOMS
BACK PAIN: 0
FATIGUE: 0
ARTHRALGIAS: 1
EYE REDNESS: 0
ABDOMINAL PAIN: 0
AGITATION: 0
CONSTIPATION: 0
FEVER: 0
ADENOPATHY: 0
DIZZINESS: 0
SHORTNESS OF BREATH: 0
PALPITATIONS: 0
FREQUENCY: 0
HEADACHES: 0
WEAKNESS: 0
ENDOCRINE NEGATIVE: 1
BLOOD IN STOOL: 0
NUMBNESS: 0
MYALGIAS: 1
DYSURIA: 0
COUGH: 0
ACTIVITY CHANGE: 0
JOINT SWELLING: 0
ALLERGIC/IMMUNOLOGIC NEGATIVE: 1
WHEEZING: 0

## 2024-07-16 NOTE — PROGRESS NOTES
"Chief Complaint:   Routine followup , multiple issues    HPI:  He is doing well, feels OK.  He has Freestyle Abdullahi, and his 30 day avg. BS is 141  He has chronic left shoulder discomfort , and has new left inner thig pain, and leg cramps ,  Has trouble getting in and out of car.Xrays show mild OA of hips and moderate lumbar spondylosis.   He has seen Endocrine Dr , and he takes Jardiance and Metformin ER daily. He saw Dr Escobedo, Nephrology also and he follows with Dr Jones, Cardiology. He was advised to get a Pacer device, but he is reluctant and requests for second opinion. He had dental work done recently.      Review of Systems   Constitutional:  Negative for activity change, fatigue and fever.   HENT:  Negative for congestion.    Eyes:  Negative for redness and visual disturbance.   Respiratory:  Negative for cough, shortness of breath and wheezing.    Cardiovascular:  Negative for chest pain, palpitations and leg swelling.   Gastrointestinal:  Negative for abdominal pain, blood in stool and constipation.   Endocrine: Negative.    Genitourinary:  Negative for dysuria, frequency and urgency.   Musculoskeletal:  Positive for arthralgias and myalgias. Negative for back pain and joint swelling.        Left shoulder discomfort , reduced ROM  Left inner thigh discomfort , getting in and out of car   Skin:  Negative for rash.   Allergic/Immunologic: Negative.    Neurological:  Negative for dizziness, weakness, numbness and headaches.   Hematological:  Negative for adenopathy.   Psychiatric/Behavioral:  Negative for agitation and behavioral problems.          /72 (BP Location: Left arm, Patient Position: Sitting, BP Cuff Size: Adult)   Pulse 67   Ht 1.651 m (5' 5\")   Wt 68.5 kg (151 lb)   SpO2 100%   BMI 25.13 kg/m²    Physical Exam  Constitutional:       Appearance: Normal appearance.   HENT:      Head: Normocephalic and atraumatic.      Right Ear: Tympanic membrane and external ear normal.      Left Ear: " Tympanic membrane and external ear normal.      Nose: No congestion.      Mouth/Throat:      Mouth: Mucous membranes are moist.      Pharynx: Oropharynx is clear.   Eyes:      Extraocular Movements: Extraocular movements intact.      Conjunctiva/sclera: Conjunctivae normal.      Pupils: Pupils are equal, round, and reactive to light.      Comments: He wears glasses   Cardiovascular:      Rate and Rhythm: Normal rate and regular rhythm.      Pulses: Normal pulses.      Heart sounds: Normal heart sounds. No murmur heard.  Pulmonary:      Effort: Pulmonary effort is normal.      Breath sounds: Normal breath sounds. No wheezing or rales.   Abdominal:      General: Abdomen is flat. Bowel sounds are normal.      Palpations: Abdomen is soft.      Hernia: No hernia is present.   Musculoskeletal:         General: No swelling or tenderness. Normal range of motion.      Cervical back: Normal range of motion and neck supple.      Right lower leg: No edema.      Left lower leg: No edema.      Comments: Reduced ROM of left shoulder , with pain  Normal gait , No hip , L spine tenderness   Skin:     General: Skin is warm and dry.      Capillary Refill: Capillary refill takes less than 2 seconds.      Findings: No rash.   Neurological:      General: No focal deficit present.      Mental Status: He is alert and oriented to person, place, and time.      Sensory: No sensory deficit.      Motor: No weakness.      Coordination: Coordination normal.      Gait: Gait normal.   Psychiatric:         Mood and Affect: Mood normal.         Behavior: Behavior normal.         Assessment and Plan:    Left inner thigh discomfort , cramps  Tylenol for comfort, Heat   XR of left hip and L spine , ordered  Consider short course of PT and Orthopedic eval       Coronary artery disease involving native coronary artery of native heart without angina pectoris - Primary    Relevant Medications    prasugrel (Effient) 10 mg tablet    metoprolol succinate XL  (Toprol-XL) 25 mg 24 hr tablet         Take only one tab daily ( not 1 1/2 tab )    He follows with  Cardiology , Dr Jonathan Jones   He had Nuclear stress test  in November 2023    Ischemic cardiomyopathy:  EF 35% s/p LAD infarction (large). Consider CRT-D.  He is reluctant to get pacer, requests for second opinion  - Referral to Cardiac Electrophysiology; Future    Diabetes mellitus without complication (CMS/[facility])    Diabetic diet, Follow A1C , it has improved now to 7.0   Monitor BS, He has Freestyle Abdullahi TD  He is now taking metformin ER , since his creatinine has improved  Take Jardiance 25mg daily  Refer to Endocrine , He is following with Dr Annita Peña  Visit of 2/13/24 reviewed          Elevated Liver Enzymes :    Statin held, check  CMP    Abdomen  US reviewed, reported normal    May resume statin if LFT is ok             Pure hypercholesterolemia  Statin, and Zetia , cardiac diet    History of heart artery stent  Effient,and followup with  Cardiology       Primary hypertension  Benicar , Metoprolol Succinate XL      Primary osteoarthritis of both shoulders  Continue PT   He takes Naprosyn as needed  Stretching exercises  He had local Injection before       CKD 2:  Monitor renal function, and electolytes  He follows with Nephrology , Dr Favian Escobedo  He had extensive serological workup , including SPEP / UPEP , JOCELYN panel, complement levels   And Hep B and C serology , all were normal       Flushot high dose in Fall   Covid booster at pharmacy   He had colonoscopy twice before  He had pneumovax and Shingrix  RSV vaccine at pharmacy

## 2024-07-17 NOTE — RESULT ENCOUNTER NOTE
Liver enzymes are normal now. Creatinine 1.53 , stable  No new recommendations  Lipid panel in Fall / next visit

## 2024-09-24 DIAGNOSIS — N18.2 CHRONIC KIDNEY DISEASE, STAGE 2 (MILD): Primary | ICD-10-CM

## 2024-09-24 DIAGNOSIS — I50.22 CHRONIC SYSTOLIC (CONGESTIVE) HEART FAILURE: ICD-10-CM

## 2024-09-24 DIAGNOSIS — E79.0 HYPERURICEMIA WITHOUT SIGNS OF INFLAMMATORY ARTHRITIS AND TOPHACEOUS DISEASE: ICD-10-CM

## 2024-09-30 ENCOUNTER — LAB (OUTPATIENT)
Dept: LAB | Facility: LAB | Age: 69
End: 2024-09-30
Payer: MEDICARE

## 2024-09-30 DIAGNOSIS — I50.22 CHRONIC SYSTOLIC (CONGESTIVE) HEART FAILURE: ICD-10-CM

## 2024-09-30 DIAGNOSIS — E11.9 DIABETES MELLITUS WITHOUT COMPLICATION (MULTI): ICD-10-CM

## 2024-09-30 DIAGNOSIS — Z00.00 MEDICARE ANNUAL WELLNESS VISIT, SUBSEQUENT: Primary | ICD-10-CM

## 2024-09-30 DIAGNOSIS — N18.2 CHRONIC KIDNEY DISEASE, STAGE 2 (MILD): ICD-10-CM

## 2024-09-30 DIAGNOSIS — E78.5 HYPERLIPIDEMIA, UNSPECIFIED HYPERLIPIDEMIA TYPE: ICD-10-CM

## 2024-09-30 DIAGNOSIS — E79.0 HYPERURICEMIA WITHOUT SIGNS OF INFLAMMATORY ARTHRITIS AND TOPHACEOUS DISEASE: ICD-10-CM

## 2024-09-30 DIAGNOSIS — I10 ESSENTIAL HYPERTENSION, BENIGN: ICD-10-CM

## 2024-09-30 DIAGNOSIS — Z00.00 MEDICARE ANNUAL WELLNESS VISIT, SUBSEQUENT: ICD-10-CM

## 2024-09-30 LAB
ALBUMIN SERPL BCP-MCNC: 4 G/DL (ref 3.4–5)
ALP SERPL-CCNC: 48 U/L (ref 33–136)
ALT SERPL W P-5'-P-CCNC: 47 U/L (ref 10–52)
ANION GAP SERPL CALC-SCNC: 10 MMOL/L (ref 10–20)
AST SERPL W P-5'-P-CCNC: 39 U/L (ref 9–39)
BILIRUB SERPL-MCNC: 0.3 MG/DL (ref 0–1.2)
BUN SERPL-MCNC: 26 MG/DL (ref 6–23)
CALCIUM SERPL-MCNC: 9.6 MG/DL (ref 8.6–10.3)
CHLORIDE SERPL-SCNC: 110 MMOL/L (ref 98–107)
CHOLEST SERPL-MCNC: 172 MG/DL (ref 0–199)
CHOLESTEROL/HDL RATIO: 4.3
CO2 SERPL-SCNC: 26 MMOL/L (ref 21–32)
CREAT SERPL-MCNC: 1.64 MG/DL (ref 0.5–1.3)
DSDNA AB SER-ACNC: <1 IU/ML
EGFRCR SERPLBLD CKD-EPI 2021: 45 ML/MIN/1.73M*2
EST. AVERAGE GLUCOSE BLD GHB EST-MCNC: 163 MG/DL
GLUCOSE SERPL-MCNC: 153 MG/DL (ref 74–99)
HBA1C MFR BLD: 7.3 %
HDLC SERPL-MCNC: 40.1 MG/DL
LDLC SERPL CALC-MCNC: 102 MG/DL
MAGNESIUM SERPL-MCNC: 2.24 MG/DL (ref 1.6–2.4)
NON HDL CHOLESTEROL: 132 MG/DL (ref 0–149)
PHOSPHATE SERPL-MCNC: 4 MG/DL (ref 2.5–4.9)
POTASSIUM SERPL-SCNC: 4.7 MMOL/L (ref 3.5–5.3)
PROT SERPL-MCNC: 6.3 G/DL (ref 6.4–8.2)
PSA SERPL-MCNC: 0.58 NG/ML
SODIUM SERPL-SCNC: 141 MMOL/L (ref 136–145)
TRIGL SERPL-MCNC: 150 MG/DL (ref 0–149)
URATE SERPL-MCNC: 7.6 MG/DL (ref 4–7.5)
VLDL: 30 MG/DL (ref 0–40)

## 2024-09-30 PROCEDURE — 84550 ASSAY OF BLOOD/URIC ACID: CPT

## 2024-09-30 PROCEDURE — 83735 ASSAY OF MAGNESIUM: CPT

## 2024-09-30 PROCEDURE — 86225 DNA ANTIBODY NATIVE: CPT

## 2024-09-30 PROCEDURE — 36415 COLL VENOUS BLD VENIPUNCTURE: CPT

## 2024-09-30 PROCEDURE — 80061 LIPID PANEL: CPT

## 2024-09-30 PROCEDURE — 80053 COMPREHEN METABOLIC PANEL: CPT

## 2024-09-30 PROCEDURE — 84100 ASSAY OF PHOSPHORUS: CPT

## 2024-10-16 ENCOUNTER — APPOINTMENT (OUTPATIENT)
Dept: PRIMARY CARE | Facility: CLINIC | Age: 69
End: 2024-10-16
Payer: MEDICARE

## 2024-11-25 ENCOUNTER — PATIENT MESSAGE (OUTPATIENT)
Dept: PRIMARY CARE | Facility: CLINIC | Age: 69
End: 2024-11-25
Payer: MEDICARE

## 2024-11-25 DIAGNOSIS — I25.10 CORONARY ARTERY DISEASE INVOLVING NATIVE CORONARY ARTERY OF NATIVE HEART WITHOUT ANGINA PECTORIS: ICD-10-CM

## 2024-11-25 DIAGNOSIS — E78.5 HYPERLIPIDEMIA, UNSPECIFIED HYPERLIPIDEMIA TYPE: ICD-10-CM

## 2024-11-25 DIAGNOSIS — E11.9 DIABETES MELLITUS WITHOUT COMPLICATION (MULTI): Primary | ICD-10-CM

## 2024-11-25 RX ORDER — EZETIMIBE 10 MG/1
10 TABLET ORAL DAILY
Qty: 30 TABLET | Refills: 0 | Status: SHIPPED | OUTPATIENT
Start: 2024-11-25

## 2024-12-03 ENCOUNTER — APPOINTMENT (OUTPATIENT)
Dept: PRIMARY CARE | Facility: CLINIC | Age: 69
End: 2024-12-03
Payer: MEDICARE

## 2024-12-05 ENCOUNTER — OFFICE VISIT (OUTPATIENT)
Dept: PRIMARY CARE | Facility: CLINIC | Age: 69
End: 2024-12-05
Payer: MEDICARE

## 2024-12-05 VITALS
HEART RATE: 88 BPM | HEIGHT: 65 IN | DIASTOLIC BLOOD PRESSURE: 72 MMHG | BODY MASS INDEX: 26.66 KG/M2 | WEIGHT: 160 LBS | SYSTOLIC BLOOD PRESSURE: 126 MMHG | OXYGEN SATURATION: 96 %

## 2024-12-05 DIAGNOSIS — I10 ESSENTIAL HYPERTENSION, BENIGN: ICD-10-CM

## 2024-12-05 DIAGNOSIS — N18.30 STAGE 3 CHRONIC KIDNEY DISEASE, UNSPECIFIED WHETHER STAGE 3A OR 3B CKD (MULTI): ICD-10-CM

## 2024-12-05 DIAGNOSIS — E11.9 DIABETES MELLITUS WITHOUT COMPLICATION (MULTI): Primary | ICD-10-CM

## 2024-12-05 DIAGNOSIS — I25.10 CORONARY ARTERY DISEASE INVOLVING NATIVE CORONARY ARTERY OF NATIVE HEART WITHOUT ANGINA PECTORIS: ICD-10-CM

## 2024-12-05 DIAGNOSIS — Z95.5 HISTORY OF HEART ARTERY STENT: ICD-10-CM

## 2024-12-05 DIAGNOSIS — E78.5 HYPERLIPIDEMIA, UNSPECIFIED HYPERLIPIDEMIA TYPE: ICD-10-CM

## 2024-12-05 LAB — POC HEMOGLOBIN A1C: 7.4 % (ref 4.2–6.5)

## 2024-12-05 PROCEDURE — 3074F SYST BP LT 130 MM HG: CPT | Performed by: INTERNAL MEDICINE

## 2024-12-05 PROCEDURE — 3051F HG A1C>EQUAL 7.0%<8.0%: CPT | Performed by: INTERNAL MEDICINE

## 2024-12-05 PROCEDURE — 99214 OFFICE O/P EST MOD 30 MIN: CPT | Performed by: INTERNAL MEDICINE

## 2024-12-05 PROCEDURE — 3061F NEG MICROALBUMINURIA REV: CPT | Performed by: INTERNAL MEDICINE

## 2024-12-05 PROCEDURE — 4010F ACE/ARB THERAPY RXD/TAKEN: CPT | Performed by: INTERNAL MEDICINE

## 2024-12-05 PROCEDURE — 3078F DIAST BP <80 MM HG: CPT | Performed by: INTERNAL MEDICINE

## 2024-12-05 PROCEDURE — 3008F BODY MASS INDEX DOCD: CPT | Performed by: INTERNAL MEDICINE

## 2024-12-05 PROCEDURE — 1159F MED LIST DOCD IN RCRD: CPT | Performed by: INTERNAL MEDICINE

## 2024-12-05 PROCEDURE — 3049F LDL-C 100-129 MG/DL: CPT | Performed by: INTERNAL MEDICINE

## 2024-12-05 PROCEDURE — 83036 HEMOGLOBIN GLYCOSYLATED A1C: CPT | Performed by: INTERNAL MEDICINE

## 2024-12-05 RX ORDER — EZETIMIBE 10 MG/1
10 TABLET ORAL DAILY
Qty: 30 TABLET | Refills: 0 | Status: SHIPPED | OUTPATIENT
Start: 2024-12-05

## 2024-12-05 RX ORDER — EMPAGLIFLOZIN 25 MG/1
25 TABLET, FILM COATED ORAL DAILY
Qty: 90 TABLET | Refills: 3 | Status: SHIPPED | OUTPATIENT
Start: 2024-12-05

## 2024-12-05 RX ORDER — METOPROLOL SUCCINATE 25 MG/1
TABLET, EXTENDED RELEASE ORAL
Qty: 135 TABLET | Refills: 3 | Status: SHIPPED | OUTPATIENT
Start: 2024-12-05

## 2024-12-05 RX ORDER — PRASUGREL 10 MG/1
10 TABLET, FILM COATED ORAL
Qty: 90 TABLET | Refills: 3 | Status: SHIPPED | OUTPATIENT
Start: 2024-12-05

## 2024-12-05 RX ORDER — OLMESARTAN MEDOXOMIL 20 MG/1
20 TABLET ORAL DAILY
Qty: 90 TABLET | Refills: 3 | Status: SHIPPED | OUTPATIENT
Start: 2024-12-05

## 2024-12-05 RX ORDER — NITROGLYCERIN 0.4 MG/1
0.4 TABLET SUBLINGUAL EVERY 5 MIN PRN
Qty: 90 TABLET | Refills: 0 | Status: SHIPPED | OUTPATIENT
Start: 2024-12-05

## 2024-12-05 ASSESSMENT — ENCOUNTER SYMPTOMS
HEADACHES: 0
PALPITATIONS: 0
WHEEZING: 0
ACTIVITY CHANGE: 0
CONSTIPATION: 0
FREQUENCY: 0
BLOOD IN STOOL: 0
ABDOMINAL PAIN: 0
DIZZINESS: 0
EYE REDNESS: 0
FEVER: 0
ADENOPATHY: 0
ARTHRALGIAS: 1
SHORTNESS OF BREATH: 0
COUGH: 0
ENDOCRINE NEGATIVE: 1
MYALGIAS: 1
FATIGUE: 0
DYSURIA: 0
NUMBNESS: 0
BACK PAIN: 0
JOINT SWELLING: 0
ALLERGIC/IMMUNOLOGIC NEGATIVE: 1
AGITATION: 0
WEAKNESS: 0

## 2024-12-05 NOTE — PROGRESS NOTES
"Chief Complaint:   Routine followup , multiple issues    HPI:  He is doing well, feels OK.  He has Freestyle Abdullahi, and  30 day average BS is 156  He has chronic left shoulder discomfort , and has new left inner thigh pain, and leg cramps ,  Has trouble getting in and out of car.Xrays show mild OA of hips and moderate lumbar spondylosis.   He has seen Endocrine Dr , and he takes Jardiance and Metformin ER daily. He saw Dr Escobedo, Nephrology also and he follows with Dr Jones, Cardiology. He was advised to get a Pacer device, but he is reluctant and requests for second opinion. He had dental work done recently.      Review of Systems   Constitutional:  Negative for activity change, fatigue and fever.   HENT:  Negative for congestion.    Eyes:  Negative for redness and visual disturbance.   Respiratory:  Negative for cough, shortness of breath and wheezing.    Cardiovascular:  Negative for chest pain, palpitations and leg swelling.   Gastrointestinal:  Negative for abdominal pain, blood in stool and constipation.   Endocrine: Negative.    Genitourinary:  Negative for dysuria, frequency and urgency.   Musculoskeletal:  Positive for arthralgias and myalgias. Negative for back pain and joint swelling.        Left shoulder discomfort , reduced ROM  Left inner thigh discomfort , getting in and out of car   Skin:  Negative for rash.   Allergic/Immunologic: Negative.    Neurological:  Negative for dizziness, weakness, numbness and headaches.   Hematological:  Negative for adenopathy.   Psychiatric/Behavioral:  Negative for agitation and behavioral problems.        Flowsheets   12/5/2024    2:19 PM      /72   BP Location Left arm   Patient Position Sitting   BP Cuff Size Adult   Heart Rate 88   Weight 72.6 kg (160 lb)   Height 1.651 m (5' 5\")   SpO2 96 %         Tobacco     Smoking status: Never   Smokeless status: Never   Reviewed: 7/16/2024   Other Vitals     BMI: 26.63 kg/m2   BSA: 1.82 m2         Physical " Exam  Constitutional:       Appearance: Normal appearance.   HENT:      Head: Normocephalic and atraumatic.      Right Ear: Tympanic membrane and external ear normal.      Left Ear: Tympanic membrane and external ear normal.      Nose: No congestion.      Mouth/Throat:      Mouth: Mucous membranes are moist.      Pharynx: Oropharynx is clear.   Eyes:      Extraocular Movements: Extraocular movements intact.      Conjunctiva/sclera: Conjunctivae normal.      Pupils: Pupils are equal, round, and reactive to light.      Comments: He wears glasses   Cardiovascular:      Rate and Rhythm: Normal rate and regular rhythm.      Pulses: Normal pulses.      Heart sounds: Normal heart sounds. No murmur heard.  Pulmonary:      Effort: Pulmonary effort is normal.      Breath sounds: Normal breath sounds. No wheezing or rales.   Abdominal:      General: Abdomen is flat. Bowel sounds are normal.      Palpations: Abdomen is soft.      Hernia: No hernia is present.   Musculoskeletal:         General: No swelling or tenderness. Normal range of motion.      Cervical back: Normal range of motion and neck supple.      Right lower leg: No edema.      Left lower leg: No edema.      Comments: Reduced ROM of left shoulder , with pain  Normal gait , No hip , L spine tenderness   Skin:     General: Skin is warm and dry.      Capillary Refill: Capillary refill takes less than 2 seconds.      Findings: No rash.   Neurological:      General: No focal deficit present.      Mental Status: He is alert and oriented to person, place, and time.      Sensory: No sensory deficit.      Motor: No weakness.      Coordination: Coordination normal.      Gait: Gait normal.   Psychiatric:         Mood and Affect: Mood normal.         Behavior: Behavior normal.         Assessment and Plan:    Left inner thigh discomfort , cramps  Tylenol for comfort, Heat   XR of left hip and L spine , reviewed , shows OA         Coronary artery disease involving native coronary  artery of native heart without angina pectoris - Primary    Relevant Medications    prasugrel (Effient) 10 mg tablet    metoprolol succinate XL (Toprol-XL) 25 mg 24 hr tablet         Take only one tab daily ( not 1 1/2 tab )    He follows with  Cardiology , Dr Jonathan Jones   He had Nuclear stress test  in November 2023    Ischemic cardiomyopathy:  EF 35% s/p LAD infarction (large). Consider CRT-D.  He is reluctant to get pacer, requests for second opinion  - Referral to Cardiac Electrophysiology; Future    Diabetes mellitus without complication     Diabetic diet, Follow A1C , 7.4 today  Monitor BS, He has Freestyle Abdullahi TD  He has not been taking metformin ER , due to CKD   Take Jardiance 25mg daily  Follow up with  Endocrine , Dr Annita Peña            Elevated Liver Enzymes :    Statin held, check  CMP    Abdomen  US reviewed, reported normal    May resume statin if LFT is ok             Pure hypercholesterolemia  Statin, and Zetia , cardiac diet    History of heart artery stent  Effient,and followup with  Cardiology       Primary hypertension  Benicar , Metoprolol Succinate XL      Primary osteoarthritis of both shoulders  Continue PT   He takes Naprosyn as needed  Stretching exercises  He had local Injection before       CKD 3:  Monitor renal function, and electolytes  He follows with Nephrology , Dr Favian Escobedo  He had extensive serological workup , including SPEP / UPEP , JOCELYN panel, complement levels   And Hep B and C serology , all were normal       Flushot high dose in Fall   Covid booster at pharmacy   He had colonoscopy twice before  He had pneumovax and Shingrix  RSV vaccine at pharmacy

## 2025-02-13 DIAGNOSIS — E78.5 HYPERLIPIDEMIA, UNSPECIFIED HYPERLIPIDEMIA TYPE: ICD-10-CM

## 2025-02-13 DIAGNOSIS — I25.10 CORONARY ARTERY DISEASE INVOLVING NATIVE CORONARY ARTERY OF NATIVE HEART WITHOUT ANGINA PECTORIS: ICD-10-CM

## 2025-02-17 RX ORDER — ATORVASTATIN CALCIUM 80 MG/1
80 TABLET, FILM COATED ORAL DAILY
Qty: 90 TABLET | Refills: 3 | OUTPATIENT
Start: 2025-02-17

## 2025-03-05 ENCOUNTER — APPOINTMENT (OUTPATIENT)
Dept: PRIMARY CARE | Facility: CLINIC | Age: 70
End: 2025-03-05
Payer: MEDICARE

## 2025-03-06 ENCOUNTER — APPOINTMENT (OUTPATIENT)
Dept: PRIMARY CARE | Facility: CLINIC | Age: 70
End: 2025-03-06
Payer: MEDICARE

## 2025-03-09 ASSESSMENT — SLIT LAMP EXAM - LIDS
COMMENTS: GOOD POSITION
COMMENTS: GOOD POSITION

## 2025-03-09 ASSESSMENT — EXTERNAL EXAM - LEFT EYE: OS_EXAM: NORMAL

## 2025-03-09 ASSESSMENT — EXTERNAL EXAM - RIGHT EYE: OD_EXAM: NORMAL

## 2025-03-09 NOTE — PROGRESS NOTES
Diabetes  -HbA1c= 7.4 (12/5/24). No diabetic retinopathy seen on exam. Continue close monitoring of blood glucose, blood pressure, and cholesterol. Plan for annual dilated eye exam.  -Endocrinologist: Dr. Annita Peña    Combined form of age-related cataract, right eyeH25.811  Combined form of age-related cataract, left eyeH25.812  -Not significant to vision at this time, and no impact on ADL's. Monitor for now. F/u 12 months to re-evaluate -- recheck refraction, glare/BAT, dilation. Plan for Lenstar/Pentacam/OCT macula only if cataract(s) visually significant and if patient would like to proceed with cataract surgery.    Amblyopia, left eye  Hyperopia  Astigmatism  Presbyopia  -Current glasses 3-4 years old   -Refraction performed today for diagnostic purposes only and without specific intent to dispense Rx. Glasses Rx not dispensed today.         No history of intraocular surgery/refractive surgery.   No FH of AMD/glaucoma

## 2025-03-12 ENCOUNTER — APPOINTMENT (OUTPATIENT)
Dept: OPHTHALMOLOGY | Age: 70
End: 2025-03-12
Payer: MEDICARE

## 2025-03-12 DIAGNOSIS — H52.203 ASTIGMATISM OF BOTH EYES, UNSPECIFIED TYPE: ICD-10-CM

## 2025-03-12 DIAGNOSIS — H25.811 COMBINED FORM OF AGE-RELATED CATARACT, RIGHT EYE: ICD-10-CM

## 2025-03-12 DIAGNOSIS — E11.9 DIABETES MELLITUS WITHOUT COMPLICATION (MULTI): Primary | ICD-10-CM

## 2025-03-12 DIAGNOSIS — H53.002 AMBLYOPIA OF LEFT EYE: ICD-10-CM

## 2025-03-12 DIAGNOSIS — H52.03 HYPEROPIA, BILATERAL: ICD-10-CM

## 2025-03-12 DIAGNOSIS — H52.4 PRESBYOPIA: ICD-10-CM

## 2025-03-12 DIAGNOSIS — H25.812 COMBINED FORM OF AGE-RELATED CATARACT, LEFT EYE: ICD-10-CM

## 2025-03-12 PROCEDURE — 92004 COMPRE OPH EXAM NEW PT 1/>: CPT | Performed by: OPHTHALMOLOGY

## 2025-03-12 RX ORDER — METFORMIN HYDROCHLORIDE 500 MG/1
TABLET ORAL EVERY 24 HOURS
COMMUNITY

## 2025-03-12 RX ORDER — ATORVASTATIN CALCIUM 80 MG/1
1 TABLET, FILM COATED ORAL DAILY
COMMUNITY

## 2025-03-12 ASSESSMENT — REFRACTION_WEARINGRX
OD_ADD: +2.50
OS_ADD: +2.50
OD_SPHERE: +2.00
OS_CYLINDER: -4.00
OD_CYLINDER: -1.00
OS_AXIS: 165
OS_SPHERE: +2.50
OD_AXIS: 090

## 2025-03-12 ASSESSMENT — VISUAL ACUITY
CORRECTION_TYPE: GLASSES
OS_BAT_MED: 20/30
OS_CC: 20/30
OD_BAT_MED: 20/20
METHOD: SNELLEN - LINEAR
OD_CC: 20/25

## 2025-03-12 ASSESSMENT — REFRACTION_MANIFEST
OS_SPHERE: +2.50
OS_AXIS: 165
OD_AXIS: 090
OD_ADD: +2.50
OD_CYLINDER: -1.00
OS_CYLINDER: -4.00
OD_SPHERE: +2.25
OS_ADD: +2.50

## 2025-03-12 ASSESSMENT — ENCOUNTER SYMPTOMS
PSYCHIATRIC NEGATIVE: 0
GASTROINTESTINAL NEGATIVE: 0
MUSCULOSKELETAL NEGATIVE: 0
HEMATOLOGIC/LYMPHATIC NEGATIVE: 0
CARDIOVASCULAR NEGATIVE: 0
ALLERGIC/IMMUNOLOGIC NEGATIVE: 0
EYES NEGATIVE: 1
ENDOCRINE NEGATIVE: 0
CONSTITUTIONAL NEGATIVE: 0
NEUROLOGICAL NEGATIVE: 0
RESPIRATORY NEGATIVE: 0

## 2025-03-12 ASSESSMENT — CUP TO DISC RATIO
OS_RATIO: 0.3
OD_RATIO: 0.3

## 2025-03-12 ASSESSMENT — TONOMETRY
OS_IOP_MMHG: 16
IOP_METHOD: GOLDMANN APPLANATION
OD_IOP_MMHG: 15

## 2025-03-12 NOTE — LETTER
2025      Travis Bhandari MD  [address]  [place] [address]      Patient name: Thanh Martínez  Patient : 1955      Dear Travis Bhandari MD,    I had the pleasure of seeing Thanh Martínez for an eye examination. Please see below for my impression and plan. Thank you for allowing me to participate in the care of this very pleasant patient. Please do not hesitate to contact me if you have any questions.    Sincerely,  Tamika Anderson MD      Impression and Plan:  Diabetes  -HbA1c= 7.4 (24). No diabetic retinopathy seen on exam. Continue close monitoring of blood glucose, blood pressure, and cholesterol. Plan for annual dilated eye exam.  -Endocrinologist: Dr. Annita Peña    Combined form of age-related cataract, right eyeH25.811  Combined form of age-related cataract, left eyeH25.812  -Not significant to vision at this time, and no impact on ADL's. Monitor for now. F/u 12 months to re-evaluate -- recheck refraction, glare/BAT, dilation. Plan for Lenstar/Pentacam/OCT macula only if cataract(s) visually significant and if patient would like to proceed with cataract surgery.    Amblyopia, left eye  Hyperopia  Astigmatism  Presbyopia  -Current glasses 3-4 years old   -Refraction performed today for diagnostic purposes only and without specific intent to dispense Rx. Glasses Rx not dispensed today.

## 2025-04-03 DIAGNOSIS — I10 ESSENTIAL HYPERTENSION, BENIGN: ICD-10-CM

## 2025-04-07 RX ORDER — OLMESARTAN MEDOXOMIL 20 MG/1
20 TABLET ORAL DAILY
Qty: 90 TABLET | Refills: 3 | OUTPATIENT
Start: 2025-04-07

## 2025-04-11 DIAGNOSIS — E11.9 DIABETES MELLITUS WITHOUT COMPLICATION: ICD-10-CM

## 2025-04-11 DIAGNOSIS — E78.5 HYPERLIPIDEMIA, UNSPECIFIED HYPERLIPIDEMIA TYPE: ICD-10-CM

## 2025-04-11 DIAGNOSIS — I25.10 CORONARY ARTERY DISEASE INVOLVING NATIVE CORONARY ARTERY OF NATIVE HEART WITHOUT ANGINA PECTORIS: ICD-10-CM

## 2025-04-11 DIAGNOSIS — Z95.5 HISTORY OF HEART ARTERY STENT: ICD-10-CM

## 2025-04-11 DIAGNOSIS — I10 ESSENTIAL HYPERTENSION, BENIGN: ICD-10-CM

## 2025-04-11 DIAGNOSIS — E11.9 DIABETES MELLITUS WITHOUT COMPLICATION: Primary | ICD-10-CM

## 2025-04-11 RX ORDER — EZETIMIBE 10 MG/1
10 TABLET ORAL DAILY
Qty: 90 TABLET | Refills: 3 | Status: SHIPPED | OUTPATIENT
Start: 2025-04-11

## 2025-04-11 RX ORDER — FLASH GLUCOSE SENSOR
KIT MISCELLANEOUS
Qty: 6 EACH | Refills: 3 | Status: SHIPPED | OUTPATIENT
Start: 2025-04-11

## 2025-04-11 RX ORDER — OLMESARTAN MEDOXOMIL 20 MG/1
20 TABLET ORAL DAILY
Qty: 90 TABLET | Refills: 0 | Status: SHIPPED | OUTPATIENT
Start: 2025-04-11

## 2025-04-11 RX ORDER — EMPAGLIFLOZIN 25 MG/1
25 TABLET, FILM COATED ORAL DAILY
Qty: 90 TABLET | Refills: 3 | Status: SHIPPED | OUTPATIENT
Start: 2025-04-11

## 2025-04-11 RX ORDER — ATORVASTATIN CALCIUM 80 MG/1
80 TABLET, FILM COATED ORAL DAILY
Qty: 90 TABLET | Refills: 1 | Status: SHIPPED | OUTPATIENT
Start: 2025-04-11

## 2025-04-11 RX ORDER — PRASUGREL 10 MG/1
10 TABLET, FILM COATED ORAL
Qty: 90 TABLET | Refills: 3 | Status: SHIPPED | OUTPATIENT
Start: 2025-04-11

## 2025-04-11 RX ORDER — METOPROLOL SUCCINATE 25 MG/1
TABLET, EXTENDED RELEASE ORAL
Qty: 135 TABLET | Refills: 3 | Status: SHIPPED | OUTPATIENT
Start: 2025-04-11

## 2025-04-14 ENCOUNTER — APPOINTMENT (OUTPATIENT)
Dept: PRIMARY CARE | Facility: CLINIC | Age: 70
End: 2025-04-14
Payer: MEDICARE

## 2025-04-14 VITALS
SYSTOLIC BLOOD PRESSURE: 102 MMHG | DIASTOLIC BLOOD PRESSURE: 56 MMHG | RESPIRATION RATE: 16 BRPM | HEART RATE: 68 BPM | WEIGHT: 160 LBS | TEMPERATURE: 97 F | BODY MASS INDEX: 26.66 KG/M2 | OXYGEN SATURATION: 99 % | HEIGHT: 65 IN

## 2025-04-14 DIAGNOSIS — N63.10 MASS OF RIGHT BREAST, UNSPECIFIED QUADRANT: ICD-10-CM

## 2025-04-14 DIAGNOSIS — E11.9 DIABETES MELLITUS WITHOUT COMPLICATION: Primary | ICD-10-CM

## 2025-04-14 DIAGNOSIS — N62 GYNECOMASTIA: ICD-10-CM

## 2025-04-14 LAB — POC HEMOGLOBIN A1C: 7.7 % (ref 4.2–6.5)

## 2025-04-14 PROCEDURE — 99214 OFFICE O/P EST MOD 30 MIN: CPT | Performed by: INTERNAL MEDICINE

## 2025-04-14 PROCEDURE — 3078F DIAST BP <80 MM HG: CPT | Performed by: INTERNAL MEDICINE

## 2025-04-14 PROCEDURE — 83036 HEMOGLOBIN GLYCOSYLATED A1C: CPT | Performed by: INTERNAL MEDICINE

## 2025-04-14 PROCEDURE — 1159F MED LIST DOCD IN RCRD: CPT | Performed by: INTERNAL MEDICINE

## 2025-04-14 PROCEDURE — 3051F HG A1C>EQUAL 7.0%<8.0%: CPT | Performed by: INTERNAL MEDICINE

## 2025-04-14 PROCEDURE — 1036F TOBACCO NON-USER: CPT | Performed by: INTERNAL MEDICINE

## 2025-04-14 PROCEDURE — 4010F ACE/ARB THERAPY RXD/TAKEN: CPT | Performed by: INTERNAL MEDICINE

## 2025-04-14 PROCEDURE — G0439 PPPS, SUBSEQ VISIT: HCPCS | Performed by: INTERNAL MEDICINE

## 2025-04-14 PROCEDURE — 3008F BODY MASS INDEX DOCD: CPT | Performed by: INTERNAL MEDICINE

## 2025-04-14 PROCEDURE — 3074F SYST BP LT 130 MM HG: CPT | Performed by: INTERNAL MEDICINE

## 2025-04-14 ASSESSMENT — ENCOUNTER SYMPTOMS
PALPITATIONS: 0
WHEEZING: 0
EYE REDNESS: 0
ENDOCRINE NEGATIVE: 1
ALLERGIC/IMMUNOLOGIC NEGATIVE: 1
BLOOD IN STOOL: 0
NUMBNESS: 0
DYSURIA: 0
FREQUENCY: 0
DIZZINESS: 0
ADENOPATHY: 0
FATIGUE: 0
AGITATION: 0
FEVER: 0
MYALGIAS: 1
CONSTIPATION: 0
ABDOMINAL PAIN: 0
WEAKNESS: 0
BACK PAIN: 0
HEADACHES: 0
COUGH: 0
ARTHRALGIAS: 1
JOINT SWELLING: 0
SHORTNESS OF BREATH: 0
ACTIVITY CHANGE: 0

## 2025-04-14 ASSESSMENT — PATIENT HEALTH QUESTIONNAIRE - PHQ9
SUM OF ALL RESPONSES TO PHQ9 QUESTIONS 1 AND 2: 0
1. LITTLE INTEREST OR PLEASURE IN DOING THINGS: NOT AT ALL
2. FEELING DOWN, DEPRESSED OR HOPELESS: NOT AT ALL

## 2025-04-14 NOTE — PROGRESS NOTES
"Chief Complaint:   Medicare Wellness Exam/Comprehensive Problem Focused Follow Up and Physical Exam    HPI:  He is doing well, feels OK.  He has Freestyle Abdullahi, and  30 day average BS is stable.A1C was 7.4  He has chronic left shoulder discomfort , and has new left inner thigh pain, and leg cramps ,  Has trouble getting in and out of car.Xrays show mild OA of hips and moderate lumbar spondylosis.   He has seen Endocrine Dr , and he takes Jardiance and Metformin ER daily. He saw Dr Escobedo, Nephrology also and he follows with Dr Jones, Cardiology. He was advised to get a Pacer device  He reports a lump in right breast  , recently ( weeks ) , and his breasts are enlarged. Not on aldactone.   Review of Systems   Constitutional:  Negative for activity change, fatigue and fever.   HENT:  Negative for congestion.    Eyes:  Negative for redness and visual disturbance.   Respiratory:  Negative for cough, shortness of breath and wheezing.    Cardiovascular:  Negative for chest pain, palpitations and leg swelling.   Gastrointestinal:  Negative for abdominal pain, blood in stool and constipation.   Endocrine: Negative.    Genitourinary:  Negative for dysuria, frequency and urgency.   Musculoskeletal:  Positive for arthralgias and myalgias. Negative for back pain and joint swelling.        Left shoulder discomfort , reduced ROM  Left inner thigh discomfort , getting in and out of car   Skin:  Negative for rash.        Hands and feet feel cold   Allergic/Immunologic: Negative.    Neurological:  Negative for dizziness, weakness, numbness and headaches.   Hematological:  Negative for adenopathy.   Psychiatric/Behavioral:  Negative for agitation and behavioral problems.      4/14/2025     11:34 AM      /56   BP Location Left arm   Patient Position Sitting   BP Cuff Size Adult   Heart Rate 68   Temp 36.1 °C (97 °F)   Temp Source Temporal   Weight 72.6 kg (160 lb)   Height 1.651 m (5' 5\")   Resp 16   SpO2 99 %         Tobacco   "   Smoking status: Never   Smokeless status: Never   Reviewed: 4/14/2025   Other Vitals     BMI: 26.63 kg/m2   BSA: 1.82 m2          Physical Exam  Constitutional:       Appearance: Normal appearance.   HENT:      Head: Normocephalic and atraumatic.      Right Ear: Tympanic membrane and external ear normal.      Left Ear: Tympanic membrane and external ear normal.      Nose: No congestion.      Mouth/Throat:      Mouth: Mucous membranes are moist.      Pharynx: Oropharynx is clear.   Eyes:      Extraocular Movements: Extraocular movements intact.      Conjunctiva/sclera: Conjunctivae normal.      Pupils: Pupils are equal, round, and reactive to light.      Comments: He wears glasses   Cardiovascular:      Rate and Rhythm: Normal rate and regular rhythm.      Pulses: Normal pulses.      Heart sounds: Normal heart sounds. No murmur heard.  Pulmonary:      Effort: Pulmonary effort is normal.      Breath sounds: Normal breath sounds. No wheezing or rales.   Abdominal:      General: Abdomen is flat. Bowel sounds are normal.      Palpations: Abdomen is soft.      Hernia: No hernia is present.   Musculoskeletal:         General: No swelling or tenderness. Normal range of motion.      Cervical back: Normal range of motion and neck supple.      Right lower leg: No edema.      Left lower leg: No edema.      Comments: Reduced ROM of left shoulder , with pain  Normal gait , No hip , L spine tenderness   Skin:     General: Skin is warm and dry.      Capillary Refill: Capillary refill takes less than 2 seconds.      Findings: No rash.      Comments: Gynecomastia , bilateral   Neurological:      General: No focal deficit present.      Mental Status: He is alert and oriented to person, place, and time.      Sensory: No sensory deficit.      Motor: No weakness.      Coordination: Coordination normal.      Gait: Gait normal.   Psychiatric:         Mood and Affect: Mood normal.         Behavior: Behavior normal.         Assessment and  Plan:    Gynecomastia  Right breast lump , per patient  Mammogram diagnostic ordered    Left inner thigh discomfort , cramps  Tylenol for comfort, Heat   XR of Left hip , pelvis - Mild bilateral hip osteoarthrosis.    XR Lumbar Spine: : lumbar spine shows changes of spondylosis, aortoiliac calcific atherosclerosis.    Analgesics, stretching exercises       Coronary artery disease involving native coronary artery of native heart without angina pectoris - Primary    Relevant Medications    prasugrel (Effient) 10 mg tablet    metoprolol succinate XL (Toprol-XL) 25 mg 24 hr tablet         Take only one tab daily ( not 1 1/2 tab )    He follows with  Cardiology , Dr Jonathan Jones , advised to make Appt.  He had Nuclear stress test  in November 2023    Ischemic cardiomyopathy:  EF 35% s/p LAD infarction (large). Consider CRT-D.  He is reluctant to get pacer, requests for second opinion  - Referral to Cardiac Electrophysiology; Future      Diabetes mellitus without complication (CMS/[facility])    Diabetic diet, Follow A1C , it has gone up, from 7.0, to 7.4 to 7.7  Monitor BS, He has Freestyle Abdullahi 3 TD device  Strict ADA diet, he admits inconsistency  He is now taking metformin ER , since his creatinine has improved  Take Jardiance 25mg daily  Consider Ozempic ?  Refer to Endocrine , He is following with Dr Annita Peña                                  Pure hypercholesterolemia  Statin, and Zetia , cardiac diet    History of heart artery stent  Effient,and followup with  Cardiology       Primary hypertension  Benicar , Metoprolol Succinate XL      Primary osteoarthritis of both shoulders  Continue PT   He takes Naprosyn as needed  Stretching exercises  He had local Injection before       CKD 2:  Monitor renal function, and electolytes  He follows with Nephrology , Dr Favian Escobedo  He had extensive serological workup , including SPEP / UPEP , JOCELYN panel, complement levels   And Hep B and C serology , all were normal        Flushot high dose in Fall   Covid booster at pharmacy , he had  He had colonoscopy twice before  He had pneumovax and Shingrix  RSV vaccine at pharmacy  Check [facility] , has seen Dr Tamika Araujo          Active Problem List  Patient Active Problem List   Diagnosis    Coronary artery disease    Pure hypercholesterolemia    Primary hypertension    Primary osteoarthritis of both shoulders    Cervical disc disorder with radiculopathy    STEMI (ST elevation myocardial infarction) (Multi)    Diabetes mellitus without complication    Status post insertion of drug-eluting stent into left anterior descending (LAD) artery    Presence of drug-eluting stent in left circumflex coronary artery    Cardiomyopathy, ischemic    Chronic kidney disease    Degeneration of intervertebral disc of lumbar region    Vitamin D deficiency    Combined form of age-related cataract, right eye    Combined form of age-related cataract, left eye    Amblyopia of left eye    Hyperopia, bilateral    Astigmatism of both eyes    Presbyopia       Comprehensive Medical/Surgical/Social/Family History  Past Medical History:   Diagnosis Date    Cardiomyopathy, ischemic 11/15/2023    Coronary artery disease involving native coronary artery of native heart without angina pectoris 10/11/2023    Diabetes mellitus (Multi)     Presence of drug-eluting stent in left circumflex coronary artery 11/09/2023    Staged PCI OM    Status post insertion of drug-eluting stent into left anterior descending (LAD) artery 11/09/2023     Past Surgical History:   Procedure Laterality Date    CAROTID STENT       mented in Social Documentation section of the chart and medication list as appropriate    Allergies and Medications  Patient has no known allergies.  Current Outpatient Medications   Medication Instructions    atorvastatin (LIPITOR) 80 mg, oral, Daily    ezetimibe (ZETIA) 10 mg, oral, Daily    flash glucose sensor kit (FreeStyle Abdullahi 2 Sensor) kit Use as  instructed    Jardiance 25 mg, oral, Daily    metFORMIN (Glucophage) 500 mg tablet Every 24 hours    metoprolol succinate XL (Toprol-XL) 25 mg 24 hr tablet 1 1/2 tab by mouth daily    nitroglycerin (NITROSTAT) 0.4 mg, sublingual, Every 5 min PRN    olmesartan (BENICAR) 20 mg, oral, Daily    prasugrel (EFFIENT) 10 mg, oral, Daily RT     Medications and Supplements  prescribed by me and other practitioners or clinical pharmacist (such as prescriptions, OTC's, herbal therapies and supplements) were reviewed and documented in the medical record.      Activities of Daily Living  In your present state of health, do you have any difficulty performing the following activities?:   Preparing food and eating?: Yes  Bathing yourself: Yes  Getting dressed: Yes  Using the toilet:Yes  Moving around from place to place: Yes  In the past year have you fallen or had a near fall?:No  Able to manage finances independently: Yes  Able to perform grocery shopping: Yes  Able to manage medications independently: Yes  Able to do housework independently: Yes  Patient self-assessment of health status? Good    Depression Screen  Depression screening (15m) completed using the PHQ-2 questions with results documented in the chart/encounter  (See note and/or Rooming Screenings for documentation)    Current exercise habits: light activities   Dietary issues discussed: Yes  Hearing difficulties: No  Safe in current home environment: Yes  Visual Acuity assessed: No  Cognitive Impairment No    Advance directives  Advanced Care Planning (including a Living Will, Healthcare POA, as well as specific end of life choices and/or directives), was discussed (~16 min) with the patient and/or surrogate, voluntarily, and details of that discussion documented in the Problem List (under Advanced Directives Discussion) of the medical record.   Advised to get a Living Will

## 2025-04-16 LAB
ALBUMIN SERPL-MCNC: 4.2 G/DL (ref 3.6–5.1)
ALP SERPL-CCNC: 53 U/L (ref 35–144)
ALT SERPL-CCNC: 112 U/L (ref 9–46)
ANION GAP SERPL CALCULATED.4IONS-SCNC: 8 MMOL/L (CALC) (ref 7–17)
AST SERPL-CCNC: 72 U/L (ref 10–35)
BILIRUB SERPL-MCNC: 0.7 MG/DL (ref 0.2–1.2)
BUN SERPL-MCNC: 31 MG/DL (ref 7–25)
CALCIUM SERPL-MCNC: 9.7 MG/DL (ref 8.6–10.3)
CHLORIDE SERPL-SCNC: 105 MMOL/L (ref 98–110)
CO2 SERPL-SCNC: 24 MMOL/L (ref 20–32)
CREAT SERPL-MCNC: 1.58 MG/DL (ref 0.7–1.28)
EGFRCR SERPLBLD CKD-EPI 2021: 46 ML/MIN/1.73M2
GLUCOSE SERPL-MCNC: 123 MG/DL (ref 65–99)
POTASSIUM SERPL-SCNC: 4.4 MMOL/L (ref 3.5–5.3)
PROT SERPL-MCNC: 6.9 G/DL (ref 6.1–8.1)
SODIUM SERPL-SCNC: 137 MMOL/L (ref 135–146)

## 2025-04-17 NOTE — RESULT ENCOUNTER NOTE
Creatinine is 1.58 , was 1.64  Follow with Nephrologist  Liver enzymes are elevated, STOP taking atorvastatin and repeat   Lab in 2 weeks, check with cardiology for other options to treat high cholesterol, do not drink alcohol if you do,  Thanks

## 2025-04-23 DIAGNOSIS — I10 ESSENTIAL HYPERTENSION, BENIGN: ICD-10-CM

## 2025-04-23 DIAGNOSIS — E11.9 DIABETES MELLITUS WITHOUT COMPLICATION: ICD-10-CM

## 2025-04-23 DIAGNOSIS — I25.10 CORONARY ARTERY DISEASE INVOLVING NATIVE CORONARY ARTERY OF NATIVE HEART WITHOUT ANGINA PECTORIS: ICD-10-CM

## 2025-04-23 DIAGNOSIS — Z95.5 HISTORY OF HEART ARTERY STENT: ICD-10-CM

## 2025-04-23 DIAGNOSIS — E78.5 HYPERLIPIDEMIA, UNSPECIFIED HYPERLIPIDEMIA TYPE: ICD-10-CM

## 2025-04-23 RX ORDER — EMPAGLIFLOZIN 25 MG/1
25 TABLET, FILM COATED ORAL DAILY
Qty: 90 TABLET | Refills: 3 | Status: SHIPPED | OUTPATIENT
Start: 2025-04-23

## 2025-04-23 RX ORDER — PRASUGREL 10 MG/1
10 TABLET, FILM COATED ORAL
Qty: 90 TABLET | Refills: 3 | Status: SHIPPED | OUTPATIENT
Start: 2025-04-23

## 2025-04-23 RX ORDER — ATORVASTATIN CALCIUM 80 MG/1
80 TABLET, FILM COATED ORAL DAILY
Qty: 90 TABLET | Refills: 3 | Status: SHIPPED | OUTPATIENT
Start: 2025-04-23

## 2025-04-23 RX ORDER — NITROGLYCERIN 0.4 MG/1
0.4 TABLET SUBLINGUAL EVERY 5 MIN PRN
Qty: 75 TABLET | Refills: 0 | Status: SHIPPED | OUTPATIENT
Start: 2025-04-23

## 2025-04-23 RX ORDER — METOPROLOL SUCCINATE 25 MG/1
TABLET, EXTENDED RELEASE ORAL
Qty: 135 TABLET | Refills: 3 | Status: SHIPPED | OUTPATIENT
Start: 2025-04-23

## 2025-04-23 RX ORDER — OLMESARTAN MEDOXOMIL 20 MG/1
20 TABLET ORAL DAILY
Qty: 90 TABLET | Refills: 3 | Status: SHIPPED | OUTPATIENT
Start: 2025-04-23

## 2025-04-23 RX ORDER — METFORMIN HYDROCHLORIDE 500 MG/1
500 TABLET ORAL
Qty: 90 TABLET | Refills: 3 | Status: SHIPPED | OUTPATIENT
Start: 2025-04-23

## 2025-04-23 RX ORDER — EZETIMIBE 10 MG/1
10 TABLET ORAL DAILY
Qty: 90 TABLET | Refills: 3 | Status: SHIPPED | OUTPATIENT
Start: 2025-04-23

## 2025-05-01 ENCOUNTER — HOSPITAL ENCOUNTER (OUTPATIENT)
Dept: RADIOLOGY | Facility: HOSPITAL | Age: 70
Discharge: HOME | End: 2025-05-01
Payer: MEDICARE

## 2025-05-01 VITALS — HEIGHT: 65 IN | BODY MASS INDEX: 26.66 KG/M2 | WEIGHT: 160 LBS

## 2025-05-01 DIAGNOSIS — N63.10 MASS OF RIGHT BREAST, UNSPECIFIED QUADRANT: ICD-10-CM

## 2025-05-01 DIAGNOSIS — N62 GYNECOMASTIA: ICD-10-CM

## 2025-05-01 PROCEDURE — 77062 BREAST TOMOSYNTHESIS BI: CPT

## 2025-05-06 ENCOUNTER — OFFICE VISIT (OUTPATIENT)
Dept: PRIMARY CARE | Facility: CLINIC | Age: 70
End: 2025-05-06
Payer: MEDICARE

## 2025-05-06 VITALS
HEIGHT: 65 IN | TEMPERATURE: 97.5 F | WEIGHT: 158.6 LBS | HEART RATE: 78 BPM | SYSTOLIC BLOOD PRESSURE: 110 MMHG | BODY MASS INDEX: 26.42 KG/M2 | OXYGEN SATURATION: 97 % | DIASTOLIC BLOOD PRESSURE: 70 MMHG

## 2025-05-06 DIAGNOSIS — I10 ESSENTIAL HYPERTENSION, BENIGN: ICD-10-CM

## 2025-05-06 DIAGNOSIS — N18.30 STAGE 3 CHRONIC KIDNEY DISEASE, UNSPECIFIED WHETHER STAGE 3A OR 3B CKD (MULTI): ICD-10-CM

## 2025-05-06 DIAGNOSIS — Z95.5 HISTORY OF HEART ARTERY STENT: ICD-10-CM

## 2025-05-06 DIAGNOSIS — I25.10 CORONARY ARTERY DISEASE INVOLVING NATIVE CORONARY ARTERY OF NATIVE HEART WITHOUT ANGINA PECTORIS: ICD-10-CM

## 2025-05-06 DIAGNOSIS — N62 GYNECOMASTIA: ICD-10-CM

## 2025-05-06 DIAGNOSIS — E11.9 DIABETES MELLITUS WITHOUT COMPLICATION: Primary | ICD-10-CM

## 2025-05-06 DIAGNOSIS — M51.360 DEGENERATION OF INTERVERTEBRAL DISC OF LUMBAR REGION WITH DISCOGENIC BACK PAIN: ICD-10-CM

## 2025-05-06 DIAGNOSIS — E78.5 HYPERLIPIDEMIA, UNSPECIFIED HYPERLIPIDEMIA TYPE: ICD-10-CM

## 2025-05-06 PROCEDURE — 3008F BODY MASS INDEX DOCD: CPT | Performed by: INTERNAL MEDICINE

## 2025-05-06 PROCEDURE — 99214 OFFICE O/P EST MOD 30 MIN: CPT | Performed by: INTERNAL MEDICINE

## 2025-05-06 PROCEDURE — 1159F MED LIST DOCD IN RCRD: CPT | Performed by: INTERNAL MEDICINE

## 2025-05-06 PROCEDURE — 3051F HG A1C>EQUAL 7.0%<8.0%: CPT | Performed by: INTERNAL MEDICINE

## 2025-05-06 PROCEDURE — 1036F TOBACCO NON-USER: CPT | Performed by: INTERNAL MEDICINE

## 2025-05-06 PROCEDURE — 4010F ACE/ARB THERAPY RXD/TAKEN: CPT | Performed by: INTERNAL MEDICINE

## 2025-05-06 PROCEDURE — 3074F SYST BP LT 130 MM HG: CPT | Performed by: INTERNAL MEDICINE

## 2025-05-06 PROCEDURE — 3078F DIAST BP <80 MM HG: CPT | Performed by: INTERNAL MEDICINE

## 2025-05-06 ASSESSMENT — ENCOUNTER SYMPTOMS
COUGH: 0
FEVER: 0
PALPITATIONS: 0
AGITATION: 0
CONSTIPATION: 0
FREQUENCY: 0
ARTHRALGIAS: 1
ENDOCRINE NEGATIVE: 1
JOINT SWELLING: 0
BLOOD IN STOOL: 0
BACK PAIN: 0
HEADACHES: 0
ABDOMINAL PAIN: 0
ALLERGIC/IMMUNOLOGIC NEGATIVE: 1
DYSURIA: 0
WHEEZING: 0
SHORTNESS OF BREATH: 0
EYE REDNESS: 0
NUMBNESS: 0
WEAKNESS: 0
FATIGUE: 0
ADENOPATHY: 0
MYALGIAS: 1
ACTIVITY CHANGE: 0
DIZZINESS: 0

## 2025-05-06 ASSESSMENT — PATIENT HEALTH QUESTIONNAIRE - PHQ9
SUM OF ALL RESPONSES TO PHQ9 QUESTIONS 1 AND 2: 0
2. FEELING DOWN, DEPRESSED OR HOPELESS: NOT AT ALL
1. LITTLE INTEREST OR PLEASURE IN DOING THINGS: NOT AT ALL

## 2025-05-06 NOTE — PROGRESS NOTES
"Chief Complaint:   Follow up, had breast mammogram for lump  HPI:  He is doing well, feels OK.  He has Freestyle Abdullahi, and  30 day average BS is stable.A1C was 7.4  He has chronic left shoulder discomfort , and has new left inner thigh pain, and leg cramps ,  Has trouble getting in and out of car.Xrays show mild OA of hips and moderate lumbar spondylosis.   He has seen Endocrine Dr , and he takes Jardiance and Metformin ER daily. He saw Dr Escobedo, Nephrology also and he follows with Dr Jones, Cardiology. He was advised to get a Pacer device  He reports a lump in right breast  , recently ( weeks ) , and his breasts are enlarged. Not on aldactone.   Review of Systems   Constitutional:  Negative for activity change, fatigue and fever.   HENT:  Negative for congestion.    Eyes:  Negative for redness and visual disturbance.   Respiratory:  Negative for cough, shortness of breath and wheezing.    Cardiovascular:  Negative for chest pain, palpitations and leg swelling.   Gastrointestinal:  Negative for abdominal pain, blood in stool and constipation.        Bloating , gas    Endocrine: Negative.    Genitourinary:  Negative for dysuria, frequency and urgency.   Musculoskeletal:  Positive for arthralgias and myalgias. Negative for back pain and joint swelling.        Left shoulder discomfort , reduced ROM  Left inner thigh discomfort , getting in and out of car   Skin:  Negative for rash.        Hands and feet feel cold   Allergic/Immunologic: Negative.    Neurological:  Negative for dizziness, weakness, numbness and headaches.   Hematological:  Negative for adenopathy.   Psychiatric/Behavioral:  Negative for agitation and behavioral problems.      5/6/2025     2:27 PM      /70   BP Location Left arm   Patient Position Sitting   BP Cuff Size Adult   Heart Rate 78   Temp 36.4 °C (97.5 °F)   Temp Source Temporal   Weight 71.9 kg (158 lb 9.6 oz)   Height 1.651 m (5' 5\")   SpO2 97 %         Tobacco     Smoking status: " Never   Smokeless status: Never   Reviewed: 5/6/2025   Other Vitals     BMI: 26.39 kg/m2   BSA: 1.82 m2          Physical Exam  Constitutional:       Appearance: Normal appearance.   HENT:      Head: Normocephalic and atraumatic.      Right Ear: Tympanic membrane and external ear normal.      Left Ear: Tympanic membrane and external ear normal.      Nose: No congestion.      Mouth/Throat:      Mouth: Mucous membranes are moist.      Pharynx: Oropharynx is clear.   Eyes:      Extraocular Movements: Extraocular movements intact.      Conjunctiva/sclera: Conjunctivae normal.      Pupils: Pupils are equal, round, and reactive to light.      Comments: He wears glasses   Cardiovascular:      Rate and Rhythm: Normal rate and regular rhythm.      Pulses: Normal pulses.      Heart sounds: Normal heart sounds. No murmur heard.  Pulmonary:      Effort: Pulmonary effort is normal.      Breath sounds: Normal breath sounds. No wheezing or rales.   Abdominal:      General: Abdomen is flat. Bowel sounds are normal.      Palpations: Abdomen is soft.      Hernia: No hernia is present.   Musculoskeletal:         General: No swelling or tenderness. Normal range of motion.      Cervical back: Normal range of motion and neck supple.      Right lower leg: No edema.      Left lower leg: No edema.      Comments: Reduced ROM of left shoulder , with pain  Normal gait , No hip , L spine tenderness   Skin:     General: Skin is warm and dry.      Capillary Refill: Capillary refill takes less than 2 seconds.      Findings: No rash.      Comments: Gynecomastia , bilateral   Neurological:      General: No focal deficit present.      Mental Status: He is alert and oriented to person, place, and time.      Sensory: No sensory deficit.      Motor: No weakness.      Coordination: Coordination normal.      Gait: Gait normal.   Psychiatric:         Mood and Affect: Mood normal.         Behavior: Behavior normal.         Assessment and  Plan:    Gynecomastia present  Negative mammogram for tumor / CA      Left inner thigh discomfort , cramps  Tylenol for comfort, Heat   XR of Left hip , pelvis - Mild bilateral hip osteoarthrosis.    XR Lumbar Spine: : lumbar spine shows changes of spondylosis, aortoiliac calcific atherosclerosis.    Analgesics, stretching exercises       Coronary artery disease involving native coronary artery of native heart without angina pectoris - Primary    Relevant Medications    prasugrel (Effient) 10 mg tablet    metoprolol succinate XL (Toprol-XL) 25 mg 24 hr tablet         Take only one tab daily ( not 1 1/2 tab )    He follows with  Cardiology , Dr Jonathan Jones , advised to make Appt.  He had Nuclear stress test  in November 2023    Ischemic cardiomyopathy:  EF 35% s/p LAD infarction (large). Consider CRT-D.  He is reluctant to get pacer, requests for second opinion  - Referral to Cardiac Electrophysiology; Future      Diabetes mellitus without complication (CMS/[facility])    Diabetic diet, Follow A1C , it has gone up, from 7.0, to 7.4 to 7.7  Monitor BS, He has Freestyle Abdullahi 3 TD device  Strict ADA diet, he admits inconsistency  He is now taking metformin ER , since his creatinine has improved  Take Jardiance 25mg daily  Consider Ozempic ?  Referred  to Endocrine , He is following with Dr Annita Peña          Bloating / Gas :  hydration    Reduce Carbs, sweets and fried food    Gas X , Simethicone                Pure hypercholesterolemia  Statin, and Zetia , cardiac diet    History of heart artery stent  Effient,and followup with  Cardiology       Primary hypertension  Benicar , Metoprolol Succinate XL      Primary osteoarthritis of both shoulders  Continue PT   He takes Naprosyn as needed  Stretching exercises  He had local Injection before       CKD 2:  Monitor renal function, and electolytes  He follows with Nephrology , Dr Favian Escobedo  He had extensive serological workup , including SPEP / UPEP , JOCELYN panel,  complement levels   And Hep B and C serology , all were normal       Flushot high dose in Fall   Covid booster at pharmacy , he had  He had colonoscopy twice before  He had pneumovax and Shingrix  RSV vaccine at pharmacy  [facility] , has seen Dr Tamika Araujo

## 2025-05-08 ENCOUNTER — OFFICE VISIT (OUTPATIENT)
Dept: CARDIOLOGY | Facility: CLINIC | Age: 70
End: 2025-05-08
Payer: MEDICARE

## 2025-05-08 VITALS
SYSTOLIC BLOOD PRESSURE: 115 MMHG | WEIGHT: 155 LBS | BODY MASS INDEX: 25.79 KG/M2 | HEART RATE: 66 BPM | DIASTOLIC BLOOD PRESSURE: 63 MMHG

## 2025-05-08 DIAGNOSIS — I25.5 CARDIOMYOPATHY, ISCHEMIC: ICD-10-CM

## 2025-05-08 DIAGNOSIS — E78.00 PURE HYPERCHOLESTEROLEMIA: ICD-10-CM

## 2025-05-08 DIAGNOSIS — I21.02 ST ELEVATION MYOCARDIAL INFARCTION INVOLVING LEFT ANTERIOR DESCENDING (LAD) CORONARY ARTERY (MULTI): ICD-10-CM

## 2025-05-08 DIAGNOSIS — I10 PRIMARY HYPERTENSION: ICD-10-CM

## 2025-05-08 DIAGNOSIS — Z95.5 PRESENCE OF DRUG-ELUTING STENT IN LEFT CIRCUMFLEX CORONARY ARTERY: ICD-10-CM

## 2025-05-08 DIAGNOSIS — Z95.5 STATUS POST INSERTION OF DRUG-ELUTING STENT INTO LEFT ANTERIOR DESCENDING (LAD) ARTERY: ICD-10-CM

## 2025-05-08 DIAGNOSIS — I25.10 CORONARY ARTERY DISEASE INVOLVING NATIVE HEART, UNSPECIFIED VESSEL OR LESION TYPE, UNSPECIFIED WHETHER ANGINA PRESENT: Primary | ICD-10-CM

## 2025-05-08 LAB
ATRIAL RATE: 68 BPM
P AXIS: 70 DEGREES
P OFFSET: 156 MS
P ONSET: 108 MS
PR INTERVAL: 198 MS
Q ONSET: 207 MS
QRS COUNT: 12 BEATS
QRS DURATION: 136 MS
QT INTERVAL: 410 MS
QTC CALCULATION(BAZETT): 435 MS
QTC FREDERICIA: 427 MS
R AXIS: 58 DEGREES
T AXIS: 247 DEGREES
T OFFSET: 412 MS
VENTRICULAR RATE: 68 BPM

## 2025-05-08 PROCEDURE — 93005 ELECTROCARDIOGRAM TRACING: CPT | Performed by: INTERNAL MEDICINE

## 2025-05-08 PROCEDURE — 1159F MED LIST DOCD IN RCRD: CPT | Performed by: INTERNAL MEDICINE

## 2025-05-08 PROCEDURE — 99214 OFFICE O/P EST MOD 30 MIN: CPT | Performed by: INTERNAL MEDICINE

## 2025-05-08 PROCEDURE — 4010F ACE/ARB THERAPY RXD/TAKEN: CPT | Performed by: INTERNAL MEDICINE

## 2025-05-08 PROCEDURE — 3051F HG A1C>EQUAL 7.0%<8.0%: CPT | Performed by: INTERNAL MEDICINE

## 2025-05-08 PROCEDURE — 1036F TOBACCO NON-USER: CPT | Performed by: INTERNAL MEDICINE

## 2025-05-08 PROCEDURE — 99212 OFFICE O/P EST SF 10 MIN: CPT

## 2025-05-08 PROCEDURE — 3074F SYST BP LT 130 MM HG: CPT | Performed by: INTERNAL MEDICINE

## 2025-05-08 PROCEDURE — 1160F RVW MEDS BY RX/DR IN RCRD: CPT | Performed by: INTERNAL MEDICINE

## 2025-05-08 PROCEDURE — 99212 OFFICE O/P EST SF 10 MIN: CPT | Performed by: INTERNAL MEDICINE

## 2025-05-08 PROCEDURE — 3078F DIAST BP <80 MM HG: CPT | Performed by: INTERNAL MEDICINE

## 2025-05-08 RX ORDER — ASPIRIN 81 MG/1
81 TABLET ORAL DAILY
COMMUNITY

## 2025-05-08 NOTE — PROGRESS NOTES
Chief Complaint:   Coronary Artery Disease     History Of Present Illness:    Thanh Martínez is a 70 y.o. male presenting with for follow-up of coronary artery disease.  Thanh initially presented with an acute anterior STEMI 11/22/22 and underwent emergent DARSHAN LAD.  The patient is tolerating guideline-directed medical therapy with antiplatelet and statin medication and is compliant.  The patient exercises regularly and follows a heart healthy diet.  The patient has been well since their last office appointment and is not having any anginal symptoms or dyspnea on exertion.  No palpitations or syncope.  Saw EP and decided not to get a CRT-D.    Review of Systems  All pertinent systems have been reviewed and are negative except for what is stated in the history of present illness.    All other systems have been reviewed and are negative and noncontributory to this patient's current ailments.   .     Last Recorded Vitals:  Visit Vitals  /63 (BP Location: Right arm, Patient Position: Sitting, BP Cuff Size: Adult)   Pulse 66   Wt 70.3 kg (155 lb)   BMI 25.79 kg/m²   Smoking Status Never   BSA 1.8 m²        Past Medical History:  He has a past medical history of Cardiomyopathy, ischemic (11/15/2023), Coronary artery disease involving native coronary artery of native heart without angina pectoris (10/11/2023), Diabetes mellitus (Multi), Presence of drug-eluting stent in left circumflex coronary artery (11/09/2023), and Status post insertion of drug-eluting stent into left anterior descending (LAD) artery (11/09/2023).    Past Surgical History:  He has a past surgical history that includes Carotid stent.      Social History:  He reports that he has never smoked. He has never used smokeless tobacco. He reports that he does not drink alcohol and does not use drugs.    Family History:  Family History   Problem Relation Name Age of Onset    Diabetes Mother      Diabetes Brother      Cancer Neg Hx      Macular  degeneration Neg Hx      Glaucoma Neg Hx          Allergies:  Patient has no known allergies.    Outpatient Medications:  Current Outpatient Medications   Medication Instructions    aspirin 81 mg, Daily    atorvastatin (LIPITOR) 80 mg, oral, Daily    ezetimibe (ZETIA) 10 mg, oral, Daily    flash glucose sensor kit (FreeStyle Abdullahi 2 Sensor) kit Use as instructed    Jardiance 25 mg, oral, Daily    metFORMIN (GLUCOPHAGE) 500 mg, oral, Daily with breakfast    metoprolol succinate XL (Toprol-XL) 25 mg 24 hr tablet 1 1/2 tab by mouth daily    nitroglycerin (Nitrostat) 0.4 mg SL tablet DISSOLVE 1 TABLET UNDER THE TONGUE EVERY 5 MINUTES IF NEEDED FOR CHEST PAIN    olmesartan (BENICAR) 20 mg, oral, Daily    prasugrel (EFFIENT) 10 mg, oral, Daily RT       Physical Exam:  Physical Exam  Vitals reviewed.   Constitutional:       General: He is not in acute distress.     Appearance: Normal appearance.   HENT:      Head: Normocephalic and atraumatic.      Nose: Nose normal.   Eyes:      Conjunctiva/sclera: Conjunctivae normal.   Cardiovascular:      Rate and Rhythm: Normal rate and regular rhythm.      Pulses: Normal pulses.      Heart sounds: No murmur heard.  Pulmonary:      Effort: Pulmonary effort is normal. No respiratory distress.      Breath sounds: Normal breath sounds. No wheezing, rhonchi or rales.   Abdominal:      General: Bowel sounds are normal. There is no distension.      Palpations: Abdomen is soft.      Tenderness: There is no abdominal tenderness.   Musculoskeletal:         General: No swelling.      Right lower leg: No edema.      Left lower leg: No edema.   Skin:     General: Skin is warm and dry.      Capillary Refill: Capillary refill takes less than 2 seconds.   Neurological:      General: No focal deficit present.      Mental Status: He is alert.   Psychiatric:         Mood and Affect: Mood normal.            Last Labs:  CBC -  Lab Results   Component Value Date    WBC 8.0 04/11/2024    HGB 14.5  04/11/2024    HCT 44.1 04/11/2024    MCV 96 04/11/2024     04/11/2024       CMP -  Lab Results   Component Value Date    CALCIUM 9.7 04/16/2025    PHOS 4.0 09/30/2024    PROT 6.9 04/16/2025    ALBUMIN 4.2 04/16/2025    AST 72 (H) 04/16/2025     (H) 04/16/2025    ALKPHOS 53 04/16/2025    BILITOT 0.7 04/16/2025       LIPID PANEL -   Lab Results   Component Value Date    CHOL 172 09/30/2024    HDL 40.1 09/30/2024    CHHDL 4.3 09/30/2024    VLDL 30 09/30/2024    TRIG 150 (H) 09/30/2024    NHDL 132 09/30/2024       RENAL FUNCTION PANEL -   Lab Results   Component Value Date    K 4.4 04/16/2025    PHOS 4.0 09/30/2024       Lab Results   Component Value Date    HGBA1C 7.7 (A) 04/14/2025       Last Cardiology Tests:  Reviewed ECG and Labs - old LBBB    Echo:  No echocardiogram results found for the past 12 months     Stress MPI today: Large LAD scar, no ischemia EF 35%      Assessment/Plan       1. Coronary artery disease involving native coronary artery of native heart without angina pectoris  CAD: The patient's CAD, as detailed in the HPI, has been clinically stable, without any anginal symptoms or dyspnea.  The patient will continue treatment with guideline-directed medical therapy with ASA/Effient and statin medications and will continue regular exercise and a heart healthy diet.        2. Pure hypercholesterolemia  Hyperlipidemia: The patient's lipids are not well controlled - taking maximal dose statin.  Will consider Repatha    3. Primary hypertension  Hypertension: The patient's blood pressure has been well-controlled at today's appointment or by recent primary care provider's measurements/home measurements and meets their goal blood pressure per the ACC/AHA guidelines.  The patient has been compliant with their anti-hypertensive medications and is following a low sodium/DASH diet and performs regular exercise.  I advised continuation of their present medical treatment and lifestyle modification.       4. Status post insertion of drug-eluting stent into left anterior descending (LAD) artery  Stable on long term DAPT    5. Cardiomyopathy, ischemic  EF 35% s/p LAD infarction (large). Declined CRT-D.    6. ST elevation myocardial infarction involving left anterior descending (LAD) coronary artery (CMS/[facility])    Thanh Martínez will return in 1 year for an office visit.          Jonathan Jones MD    Exclusive of any other services or procedures performed, I, Jonathan Jones MD , spent 30 minutes in duration for this visit today.  This time consisted of chart review, obtaining history, and/or performing the exam as documented above as well as documenting the clinical information for the encounter in the electronic record, discussing treatment options, plans, and/or goals with patient, family, and/or caregiver, refilling medications, updating the electronic record, ordering medicines, lab work, imaging, referrals, and/or procedures as documented above and communicating with other [facility] professionals. I have discussed the results of laboratory, radiology, and cardiology studies with the patient and their family/caregiver.

## 2025-05-09 LAB
ALBUMIN SERPL-MCNC: 4.2 G/DL (ref 3.6–5.1)
ALBUMIN/GLOB SERPL: 1.6 (CALC) (ref 1–2.5)
ALP SERPL-CCNC: 52 U/L (ref 35–144)
ALT SERPL-CCNC: 131 U/L (ref 9–46)
AST SERPL-CCNC: 80 U/L (ref 10–35)
BILIRUB DIRECT SERPL-MCNC: 0.2 MG/DL
BILIRUB INDIRECT SERPL-MCNC: 0.5 MG/DL (CALC) (ref 0.2–1.2)
BILIRUB SERPL-MCNC: 0.7 MG/DL (ref 0.2–1.2)
CHOLEST SERPL-MCNC: 92 MG/DL
CHOLEST/HDLC SERPL: 2 (CALC)
GLOBULIN SER CALC-MCNC: 2.7 G/DL (CALC) (ref 1.9–3.7)
HDLC SERPL-MCNC: 46 MG/DL
LDLC SERPL CALC-MCNC: 30 MG/DL (CALC)
NONHDLC SERPL-MCNC: 46 MG/DL (CALC)
PROT SERPL-MCNC: 6.9 G/DL (ref 6.1–8.1)
TRIGL SERPL-MCNC: 77 MG/DL

## 2025-05-12 ENCOUNTER — TELEPHONE (OUTPATIENT)
Dept: CARDIOLOGY | Facility: CLINIC | Age: 70
End: 2025-05-12
Payer: MEDICARE

## 2025-05-12 DIAGNOSIS — R79.89 ELEVATED LFTS: Primary | ICD-10-CM

## 2025-05-12 NOTE — TELEPHONE ENCOUNTER
----- Message from Jonathan Jones sent at 5/12/2025  6:59 AM EDT -----  Lipids excellent.  LFTs are elevated same as last time.  Refer to GI ([facility]) for evaluation.  ----- Message -----  From: [facility] ([place]) Limited Results In  Sent: 5/9/2025   8:27 AM EDT  To: Jonathan Jones MD

## 2025-07-14 ENCOUNTER — APPOINTMENT (OUTPATIENT)
Dept: PRIMARY CARE | Facility: CLINIC | Age: 70
End: 2025-07-14
Payer: MEDICARE